# Patient Record
Sex: MALE | Race: WHITE | NOT HISPANIC OR LATINO | URBAN - METROPOLITAN AREA
[De-identification: names, ages, dates, MRNs, and addresses within clinical notes are randomized per-mention and may not be internally consistent; named-entity substitution may affect disease eponyms.]

---

## 2017-01-16 ENCOUNTER — FOLLOW UP (OUTPATIENT)
Dept: URBAN - METROPOLITAN AREA CLINIC 35 | Facility: CLINIC | Age: 67
End: 2017-01-16

## 2017-01-16 DIAGNOSIS — H40.9: ICD-10-CM

## 2017-01-16 DIAGNOSIS — H43.393: ICD-10-CM

## 2017-01-16 DIAGNOSIS — H34.11: ICD-10-CM

## 2017-01-16 DIAGNOSIS — E11.9: ICD-10-CM

## 2017-01-16 DIAGNOSIS — H25.9: ICD-10-CM

## 2017-01-16 PROCEDURE — 1036F TOBACCO NON-USER: CPT

## 2017-01-16 PROCEDURE — G8427 DOCREV CUR MEDS BY ELIG CLIN: HCPCS

## 2017-01-16 PROCEDURE — 92014 COMPRE OPH EXAM EST PT 1/>: CPT

## 2017-01-16 PROCEDURE — 4040F PNEUMOC VAC/ADMIN/RCVD: CPT

## 2017-01-16 PROCEDURE — G8482 FLU IMMUNIZE ORDER/ADMIN: HCPCS

## 2017-01-16 ASSESSMENT — TONOMETRY
OD_IOP_MMHG: 11
OS_IOP_MMHG: 15

## 2017-01-16 ASSESSMENT — VISUAL ACUITY
OS_PH: 20/30+1
OS_CC: 20/40+2

## 2017-07-31 ENCOUNTER — 6 MONTH FOLLOW UP (OUTPATIENT)
Dept: URBAN - METROPOLITAN AREA CLINIC 35 | Facility: CLINIC | Age: 67
End: 2017-07-31

## 2017-07-31 DIAGNOSIS — E11.9: ICD-10-CM

## 2017-07-31 DIAGNOSIS — H34.11: ICD-10-CM

## 2017-07-31 DIAGNOSIS — H25.9: ICD-10-CM

## 2017-07-31 DIAGNOSIS — H43.393: ICD-10-CM

## 2017-07-31 DIAGNOSIS — H40.9: ICD-10-CM

## 2017-07-31 PROCEDURE — 4040F PNEUMOC VAC/ADMIN/RCVD: CPT

## 2017-07-31 PROCEDURE — 92014 COMPRE OPH EXAM EST PT 1/>: CPT

## 2017-07-31 PROCEDURE — G8427 DOCREV CUR MEDS BY ELIG CLIN: HCPCS

## 2017-07-31 PROCEDURE — G8482 FLU IMMUNIZE ORDER/ADMIN: HCPCS

## 2017-07-31 PROCEDURE — 1036F TOBACCO NON-USER: CPT

## 2017-07-31 ASSESSMENT — TONOMETRY
OS_IOP_MMHG: 19
OD_IOP_MMHG: 18

## 2017-07-31 ASSESSMENT — VISUAL ACUITY: OS_CC: 20/20-1

## 2018-01-29 ENCOUNTER — 6 MONTH FOLLOW UP (OUTPATIENT)
Dept: URBAN - METROPOLITAN AREA CLINIC 35 | Facility: CLINIC | Age: 68
End: 2018-01-29

## 2018-01-29 DIAGNOSIS — H43.393: ICD-10-CM

## 2018-01-29 DIAGNOSIS — H25.9: ICD-10-CM

## 2018-01-29 DIAGNOSIS — H34.11: ICD-10-CM

## 2018-01-29 DIAGNOSIS — H40.9: ICD-10-CM

## 2018-01-29 DIAGNOSIS — E11.9: ICD-10-CM

## 2018-01-29 PROCEDURE — 92014 COMPRE OPH EXAM EST PT 1/>: CPT

## 2018-01-29 ASSESSMENT — VISUAL ACUITY: OS_CC: 20/30+1

## 2018-01-29 ASSESSMENT — TONOMETRY
OS_IOP_MMHG: 21
OD_IOP_MMHG: 20

## 2018-03-23 ENCOUNTER — OFFICE VISIT (OUTPATIENT)
Dept: CARDIOLOGY | Facility: CLINIC | Age: 68
End: 2018-03-23
Attending: INTERNAL MEDICINE
Payer: MEDICARE

## 2018-03-23 VITALS
OXYGEN SATURATION: 91 % | HEART RATE: 72 BPM | BODY MASS INDEX: 44.65 KG/M2 | DIASTOLIC BLOOD PRESSURE: 70 MMHG | HEIGHT: 63 IN | SYSTOLIC BLOOD PRESSURE: 138 MMHG | WEIGHT: 252 LBS

## 2018-03-23 DIAGNOSIS — I35.0 NONRHEUMATIC AORTIC VALVE STENOSIS: ICD-10-CM

## 2018-03-23 DIAGNOSIS — I10 ESSENTIAL HYPERTENSION: ICD-10-CM

## 2018-03-23 DIAGNOSIS — I25.10 CORONARY ARTERY DISEASE INVOLVING NATIVE CORONARY ARTERY OF NATIVE HEART WITHOUT ANGINA PECTORIS: ICD-10-CM

## 2018-03-23 DIAGNOSIS — I45.10 RBBB (RIGHT BUNDLE BRANCH BLOCK): ICD-10-CM

## 2018-03-23 DIAGNOSIS — I50.32 CHRONIC DIASTOLIC CONGESTIVE HEART FAILURE (CMS/HCC): Primary | ICD-10-CM

## 2018-03-23 DIAGNOSIS — I73.9 PERIPHERAL ARTERIAL DISEASE (CMS/HCC): ICD-10-CM

## 2018-03-23 DIAGNOSIS — I27.20 PULMONARY HYPERTENSION (CMS/HCC): ICD-10-CM

## 2018-03-23 DIAGNOSIS — E11.59 TYPE 2 DIABETES MELLITUS WITH OTHER CIRCULATORY COMPLICATION, WITH LONG-TERM CURRENT USE OF INSULIN: ICD-10-CM

## 2018-03-23 DIAGNOSIS — E78.2 MIXED HYPERLIPIDEMIA: ICD-10-CM

## 2018-03-23 DIAGNOSIS — G47.33 OSA (OBSTRUCTIVE SLEEP APNEA): ICD-10-CM

## 2018-03-23 DIAGNOSIS — Z79.4 TYPE 2 DIABETES MELLITUS WITH OTHER CIRCULATORY COMPLICATION, WITH LONG-TERM CURRENT USE OF INSULIN: ICD-10-CM

## 2018-03-23 DIAGNOSIS — I65.23 ATHEROSCLEROSIS OF BOTH CAROTID ARTERIES: ICD-10-CM

## 2018-03-23 PROBLEM — I50.9 CONGESTIVE HEART FAILURE (CMS/HCC): Status: ACTIVE | Noted: 2018-03-23

## 2018-03-23 PROCEDURE — 99214 OFFICE O/P EST MOD 30 MIN: CPT | Performed by: INTERNAL MEDICINE

## 2018-03-23 PROCEDURE — 93000 ELECTROCARDIOGRAM COMPLETE: CPT | Performed by: INTERNAL MEDICINE

## 2018-03-23 RX ORDER — METOPROLOL SUCCINATE 100 MG/1
100 TABLET, EXTENDED RELEASE ORAL
COMMUNITY
Start: 2017-08-18 | End: 2018-03-23 | Stop reason: SDUPTHER

## 2018-03-23 RX ORDER — METOCLOPRAMIDE 10 MG/1
10 TABLET ORAL NIGHTLY
Refills: 3 | COMMUNITY
Start: 2018-03-08 | End: 2019-04-11

## 2018-03-23 RX ORDER — LEVOTHYROXINE SODIUM 75 UG/1
75 TABLET ORAL
Refills: 4 | COMMUNITY
Start: 2018-03-09

## 2018-03-23 RX ORDER — LATANOPROST 50 UG/ML
SOLUTION/ DROPS OPHTHALMIC
Refills: 1 | COMMUNITY
Start: 2018-02-21 | End: 2019-04-11

## 2018-03-23 RX ORDER — TORSEMIDE 20 MG/1
20 TABLET ORAL
COMMUNITY
Start: 2017-11-24 | End: 2018-03-23

## 2018-03-23 RX ORDER — INSULIN GLARGINE 100 [IU]/ML
INJECTION, SOLUTION SUBCUTANEOUS
COMMUNITY
Start: 2017-11-24

## 2018-03-23 RX ORDER — ASPIRIN 325 MG
325 TABLET ORAL DAILY
COMMUNITY
Start: 2015-02-10

## 2018-03-23 RX ORDER — ATORVASTATIN CALCIUM 40 MG/1
40 TABLET, FILM COATED ORAL
COMMUNITY
Start: 2017-09-21 | End: 2018-09-18 | Stop reason: SDUPTHER

## 2018-03-23 RX ORDER — TORSEMIDE 20 MG/1
40 TABLET ORAL
Qty: 180 TABLET | Refills: 11 | Status: SHIPPED | OUTPATIENT
Start: 2018-03-23 | End: 2018-03-23 | Stop reason: SDUPTHER

## 2018-03-23 RX ORDER — ATORVASTATIN CALCIUM 40 MG/1
TABLET, FILM COATED ORAL
Refills: 3 | COMMUNITY
Start: 2017-12-19 | End: 2018-03-23 | Stop reason: SDUPTHER

## 2018-03-23 RX ORDER — NIFEDIPINE 90 MG/1
90 TABLET, EXTENDED RELEASE ORAL
COMMUNITY
Start: 2018-01-19 | End: 2018-03-23 | Stop reason: SDUPTHER

## 2018-03-23 RX ORDER — BLOOD SUGAR DIAGNOSTIC
STRIP MISCELLANEOUS
Refills: 3 | COMMUNITY
Start: 2018-03-08

## 2018-03-23 RX ORDER — FERROUS SULFATE 325(65) MG
1 TABLET ORAL
Refills: 3 | COMMUNITY
Start: 2017-12-26 | End: 2018-03-23 | Stop reason: SDUPTHER

## 2018-03-23 RX ORDER — TAMSULOSIN HYDROCHLORIDE 0.4 MG/1
0.4 CAPSULE ORAL
COMMUNITY
End: 2018-03-23

## 2018-03-23 RX ORDER — CALCIPOTRIENE, BETAMETHASONE DIPROPIONATE 50; .643 UG/G; MG/G
OINTMENT TOPICAL
COMMUNITY

## 2018-03-23 RX ORDER — METOCLOPRAMIDE 5 MG/1
5 TABLET ORAL
COMMUNITY
Start: 2017-05-19 | End: 2019-04-11

## 2018-03-23 RX ORDER — NIFEDIPINE 90 MG/1
TABLET, EXTENDED RELEASE ORAL
Refills: 11 | COMMUNITY
Start: 2018-01-02 | End: 2019-03-20 | Stop reason: SDUPTHER

## 2018-03-23 RX ORDER — INSULIN GLARGINE 100 [IU]/ML
INJECTION, SOLUTION SUBCUTANEOUS
Refills: 3 | COMMUNITY
Start: 2018-02-23

## 2018-03-23 RX ORDER — POTASSIUM CHLORIDE 1500 MG/1
20 TABLET, EXTENDED RELEASE ORAL 2 TIMES DAILY
Refills: 3 | COMMUNITY
Start: 2018-02-01

## 2018-03-23 RX ORDER — TORSEMIDE 20 MG/1
40 TABLET ORAL 2 TIMES DAILY
COMMUNITY
Start: 2018-03-23 | End: 2018-10-19 | Stop reason: SDUPTHER

## 2018-03-23 RX ORDER — METOPROLOL SUCCINATE 100 MG/1
TABLET, EXTENDED RELEASE ORAL
Refills: 3 | COMMUNITY
Start: 2018-01-11 | End: 2018-10-19 | Stop reason: SDUPTHER

## 2018-03-23 RX ORDER — ESOMEPRAZOLE MAGNESIUM 40 MG/1
40 CAPSULE, DELAYED RELEASE ORAL
COMMUNITY
End: 2018-03-23 | Stop reason: SDUPTHER

## 2018-03-23 RX ORDER — INSULIN LISPRO 100 [IU]/ML
INJECTION, SOLUTION INTRAVENOUS; SUBCUTANEOUS
Refills: 3 | COMMUNITY
Start: 2018-02-04

## 2018-03-23 RX ORDER — ESOMEPRAZOLE MAGNESIUM 40 MG/1
40 CAPSULE, DELAYED RELEASE ORAL
Refills: 3 | COMMUNITY
Start: 2018-02-24

## 2018-03-23 RX ORDER — ASCORBIC ACID 500 MG
500 TABLET ORAL
COMMUNITY
End: 2018-03-23 | Stop reason: SDUPTHER

## 2018-03-23 RX ORDER — LANCETS
EACH MISCELLANEOUS
Refills: 3 | COMMUNITY
Start: 2018-02-13 | End: 2018-03-23 | Stop reason: SDUPTHER

## 2018-03-23 RX ORDER — TAMSULOSIN HYDROCHLORIDE 0.4 MG/1
0.4 CAPSULE ORAL DAILY
COMMUNITY
Start: 2017-11-24

## 2018-03-23 RX ORDER — CHLORHEXIDINE GLUCONATE ORAL RINSE 1.2 MG/ML
SOLUTION DENTAL
Refills: 3 | COMMUNITY
Start: 2018-01-16 | End: 2018-03-23 | Stop reason: SDUPTHER

## 2018-03-23 RX ORDER — LATANOPROST 50 UG/ML
1 SOLUTION/ DROPS OPHTHALMIC DAILY
COMMUNITY
Start: 2015-01-27

## 2018-03-23 RX ORDER — LEVOTHYROXINE SODIUM 50 UG/1
TABLET ORAL
COMMUNITY
End: 2018-03-23 | Stop reason: SDUPTHER

## 2018-03-23 RX ORDER — SODIUM PHOSPHATE, MONOBASIC, MONOHYDRATE, SODIUM PHOSPHATE, DIBASIC ANHYDROUS 1.102; .398 G/1; G/1
TABLET ORAL SEE ADMIN INSTRUCTIONS
Refills: 0 | COMMUNITY
Start: 2018-01-02 | End: 2018-03-23 | Stop reason: SDUPTHER

## 2018-03-23 RX ORDER — TAMSULOSIN HYDROCHLORIDE 0.4 MG/1
CAPSULE ORAL
Refills: 6 | COMMUNITY
Start: 2018-02-23 | End: 2018-03-23 | Stop reason: SDUPTHER

## 2018-03-23 RX ORDER — TORSEMIDE 20 MG/1
40 TABLET ORAL
Refills: 11 | COMMUNITY
Start: 2017-12-27 | End: 2018-03-23

## 2018-03-23 RX ORDER — AMOXICILLIN 500 MG/1
CAPSULE ORAL
Refills: 0 | COMMUNITY
Start: 2018-01-29

## 2018-03-23 RX ORDER — POTASSIUM CHLORIDE 1500 MG/1
20 TABLET, EXTENDED RELEASE ORAL
COMMUNITY
End: 2018-03-23 | Stop reason: SDUPTHER

## 2018-03-23 ASSESSMENT — ENCOUNTER SYMPTOMS
LEFT EYE: 0
BACK PAIN: 1
WEIGHT GAIN: 0
LIGHT-HEADEDNESS: 0
BRUISES/BLEEDS EASILY: 0
SHORTNESS OF BREATH: 0
SNORING: 0
VOMITING: 0
COUGH: 0
ABDOMINAL PAIN: 0
WHEEZING: 0
DEPRESSION: 0
ORTHOPNEA: 0
HEMATOCHEZIA: 0
PND: 0
IRREGULAR HEARTBEAT: 0
SYNCOPE: 0
MYALGIAS: 0
ALTERED MENTAL STATUS: 0
DYSPNEA ON EXERTION: 0
NAUSEA: 0
DIZZINESS: 0
HEMATURIA: 0
NEAR-SYNCOPE: 0
RIGHT EYE: 0
PALPITATIONS: 0
COLOR CHANGE: 1

## 2018-03-23 NOTE — PATIENT INSTRUCTIONS
--Exercise!!  --Compression socks (15-30 mmHg of pressure)  --Call me if dizziness continues  --Echo in October with next visit

## 2018-03-23 NOTE — ASSESSMENT & PLAN NOTE
Hospitalized October 14-24, 2016 for acute on chronic diastolic heart failure with aggressive diuresis with IV Bumex and ultimate transition to PO torsemide prior to discharge. Notably, admission weight was 244 pounds and discharge weight 225 pounds.   He is 252 pounds on exam today, stable from last visit November 2017.  As previously discussed, dyspnea and hypoxemia are likely multifactorial: secondary to chronic diastolic heart failure, untreated sleep apnea, and restrictive lung disease in the setting of obesity.   --Continue torsemide 40mg TWICE daily.   --Monitor home weights and call with increase >2 pounds in 24 hours.   --Stressed fluid and dietary compliance. CHF education provided.   --Stressed at length the importance of compliance with BiPAP, which has been an ongoing struggle for him.   --Also recommend compression socks, knee length.   --CHF diet discussed at length.

## 2018-03-23 NOTE — ASSESSMENT & PLAN NOTE
Poor compliance with CPAP, now trying to use BiPAP although compliance remains suboptimal.   --Again stressed the importance of compliance with BiPAP therapy and risk of heart failure, severe hypertension, arrhythmia, stroke, and death without treatment. He reports understanding and will attempt to increase use. Follow-up with sleep medicine.   --Discussed lifestyle modification and need for weight loss. Recommend consideration of bariatric surgery as a majority of his medical issues are secondary to obesity.

## 2018-03-23 NOTE — ASSESSMENT & PLAN NOTE
Status post #23 [facility] [employer] Trifecta, 5/14/15 with Dr. Quiroga.   Echocardiogram from 10/18/16: Status post #23 trifecta aortic valve prosthesis with peak velocity 2.2 m/s, mean gradient 11 mmHg. Mild paravalvular aortic regurgitation.   --Repeat echocardiogram October 2018.  --Continue aspirin and statin.   --Patient aware of need for antibiotic prophylaxis prior to dental procedures, has a prescription for amoxicillin.

## 2018-03-23 NOTE — ASSESSMENT & PLAN NOTE
The patient has associated coronary and peripheral artery disease and diabetes.  He is tolerating lipid lowering therapy well.  Lipids from 3/13/17: total cholesterol 144, triglycerides 130, HDL 44, LDL 74.  --Continue high-intensity statin, atorvastatin 40 mg daily. Goal LDL <70.

## 2018-03-23 NOTE — ASSESSMENT & PLAN NOTE
[facility] 10/14/16: RA 16, RV 75/16, PA 75/27, PCWP 26, CO 5.7, CI 2.7, PA sat 58%.  Secondary to diastolic heart failure and underlying obesity hypoventilation syndrome.  --Continue diuresis.  --Encouraged weight loss.

## 2018-03-23 NOTE — ASSESSMENT & PLAN NOTE
Carotid CTA 9/9/2014 showed mild atherosclerosis of the right carotid bifurcation and 40% stenosis at the origin of the left internal carotid artery with heavily calcified plaque at this level. Carotid US 11/4/17 reports no significant stenosis.   --Continue high intensity statin, atorvastatin 40mg daily.   --Continue aspirin, but advised to decrease to 81mg.

## 2018-03-23 NOTE — LETTER
March 23, 2018     Alan Solorio, DO  [address] [facility] [address]    Patient: Jimmie Solorio   YOB: 1950   Date of Visit: 3/23/2018       Dear Dr. Solorio:    Thank you for referring Jimmie Solorio to me for evaluation. Below are my notes for this consultation.    If you have questions, please do not hesitate to call me. I look forward to following your patient along with you.         Sincerely,        Crys Valencia MD        CC: Christopher J Frankel, MD Katie M. Hawthorne, MD  3/23/2018  3:08 PM  Sign at close encounter   Crys Valencia MD, [facility]  Cardiology    [facility]  [facility]  [address] [place] [address]    TEL  [phone]  [facility].[email or web address]     March 23, 2018    Reason for visit: Cardiovascular Follow-Up.    Mr. Solorio is a 67 year old man with chronic diastolic heart failure, aortic stenosis status post bioprosthetic aortic valve replacement (5/14/15), nonobstructive coronary artery disease, carotid artery disease, hypertension, hyperlipidemia, diabetes, and obstructive sleep apnea; who presents for cardiovascular follow-up. I last saw him in the office on November 24, 2017 at which time he had a 20 pound weight gain, therefore I increased standing torsemide although weight was some fluid and some weight from overeating.    Today, Mr. Solorio returns for cardiovascular follow-up and is accompanied by his wife, who assists in history and decision-making. He continues to have significant dietary indiscretion. He is again following with Dr. Gibson, however does not stick to her diet plan. Jorge Luis is not walking. He states he is limited by back pain and left leg claudication as well as hip pain. He denies chest pain, shortness of breath, or orthopnea. Lower extremity edema is stable. He is not wearing compression stockings, nor using his CPAP regularly. He denies palpitations, dizziness,  lightheadedness, or syncope.     Past Medical History:  1. Aortic stenosis: s/p AVR (#23 [facility] Trifecta, 5/14/15)  2. Coronary artery disease: non-obstructive 30% prox LCx, 30% PDA  3. Hypertension  4. Hyperlipidemia  5. Carotid artery disease  6. Peripheral vascular disease: left SFA  7. Right bundle branch block  8. Obstructive sleep apnea: non-compliant with CPAP  9. CVA: Retinal stroke  10. Diabetes mellitus  11. Former tobacco abuse  12. GERD    Past Surgical History:  1. Aortic valve replacement: #23 [facility] Trifecta, 5/14/15, Dr. Quiroga    Medications (reviewed with patient and updated in EMR): aspirin 325mg daily, atorvastatin 40mg daily, torsemide 40mg daily, metoprolol succinate 150mg daily, nifedipine 90mg daily, potassium 20 mEq daily, amoxicillin 2g PRN dental procedures, insulin as directed, levothyroxine 75mcg daily, Nexium 40mg daily, sildenafil 25 mg as needed, vitamin B, co-Q 10, vitamin C.    Allergies: No known drug allergies.    Social History: . Two children. Currently unemployed, previously worked as an .     Family History: Reviewed and noncontributory.    Review of Systems   Constitution: Positive for malaise/fatigue. Negative for weight gain.   HENT: Negative for nosebleeds.    Eyes: Negative for vision loss in left eye, vision loss in right eye and visual disturbance.   Cardiovascular: Positive for leg swelling. Negative for chest pain, cyanosis, dyspnea on exertion, irregular heartbeat, near-syncope, orthopnea, palpitations, paroxysmal nocturnal dyspnea and syncope.   Respiratory: Negative for cough, shortness of breath, snoring and wheezing.    Endocrine: Negative for polyuria.   Hematologic/Lymphatic: Negative for bleeding problem. Does not bruise/bleed easily.   Skin: Positive for color change and rash.   Musculoskeletal: Positive for arthritis and back pain. Negative for myalgias.   Gastrointestinal: Negative for abdominal pain, hematochezia, melena,  nausea and vomiting.   Genitourinary: Negative for hematuria.   Neurological: Negative for dizziness and light-headedness.   Psychiatric/Behavioral: Negative for altered mental status and depression.       Objective   Vitals:    03/23/18 1154   BP: 138/70   Pulse: 72   SpO2: (!) 91%     Body mass index is 44.64 kg/m².  Physical Exam   Constitutional: He appears well-developed and well-nourished. No distress.   HENT:   Head: Normocephalic.   Mouth/Throat: Mucous membranes are normal. Mucous membranes are not cyanotic.   Eyes: Conjunctivae are normal.   Neck: No JVD present. Carotid bruit is not present.   Cardiovascular: Normal rate, regular rhythm, S1 normal and S2 normal.   No extrasystoles are present. Exam reveals no gallop.    Murmur (2/6 SAIMA, 1/6 diastolic blowing at base) heard.  Pulses:       Carotid pulses are 2+ on the right side, and 2+ on the left side.       Radial pulses are 2+ on the right side, and 2+ on the left side.   Pulmonary/Chest: Effort normal. No respiratory distress. He has no wheezes. He has no rales.   Abdominal: Soft. Bowel sounds are normal. There is no tenderness.   Musculoskeletal: He exhibits edema.   Lymphadenopathy:     He has no cervical adenopathy.   Neurological: He is alert.   Skin: Skin is warm and dry. Rash (Chronic stasis changes.) noted. No cyanosis.   Psychiatric: He has a normal mood and affect. His speech is normal.     Labs:  Phlebotomy 3/13/17: Hemoglobin A1c 6.9, sodium 138, potassium 4.2, BUN 41, creatinine 1.53, GFR 46, total cholesterol 144, triglycerides 130, HDL 44, LDL 74. From 11/17/16: creatinine 1.6, GFR 44. My interpretation: stable renal insufficiency, normal potassium, low HDL, adequate LDL, adequate hemoglobin A1c.    Cardiovascular Studies:  1. Echocardiogram 10/18/16: Mild concentric left ventricular hypertrophy with normal cavity size and preserved systolic function, EF 60%. Abnormal septal motion. Mildly dilated right ventricular cavity size with  preserved systolic function. Status post #23 trifecta aortic valve prosthesis with peak velocity 2.2 m/s, mean gradient 11 mmHg. Mild aortic regurgitation. Mild mitral stenosis with mean gradient of 2 mmHg heart rate is 71. Trace mitral regurgitation. Severe MAC. Unable to estimate right ventricular systolic pressure secondary to lack of TR jet.  2. [facility] 10/14/16: RA 16, RV 75/16, PA 75/27, PCWP 26, CO 5.7, CI 2.7, PA sat 58%.  3. [facility] 5/18/15 (personally reviewed): Post-AVR. Bioprosthetic AVR with peak velocity 1.98 m/s, mean gradient of 8 mmHg. No paravalvular regurgitation. Trace transvalvular regurgitation. Normal LV size and systolic function. Normal RV size and systolic function.   4. Coronary angiogram 3/9/15 ([place]): patent LMCA, 30% prox LCx, 30% PDA.   5. Carotid CTA 9/9/2014: Mild atherosclerotic changes of the right carotid bifurcation with out evidence for focal areas of stenosis at the origin of the right ICA. Approximately 40% stenosis at the origin of the left ICA given by heavily calcified plaque at this level. Patent b/l vertebral arteries w/o focal areas of stenosis.    ECG from today personally reviewed and discussed with the patient shows sinus rhythm with right bundle branch block, left atrial enlargement.    Assessment/Plan   Problem List Items Addressed This Visit     Pulmonary hypertension     [facility] 10/14/16: RA 16, RV 75/16, PA 75/27, PCWP 26, CO 5.7, CI 2.7, PA sat 58%.  Secondary to diastolic heart failure and underlying obesity hypoventilation syndrome.  --Continue diuresis.  --Encouraged weight loss.         Congestive heart failure (CMS/HCC) (HCC) - Primary     Hospitalized October 14-24, 2016 for acute on chronic diastolic heart failure with aggressive diuresis with IV Bumex and ultimate transition to PO torsemide prior to discharge. Notably, admission weight was 244 pounds and discharge weight 225 pounds.   He is 252 pounds on exam today, stable from last visit November 2017.  As  previously discussed, dyspnea and hypoxemia are likely multifactorial: secondary to chronic diastolic heart failure, untreated sleep apnea, and restrictive lung disease in the setting of obesity.   --Continue torsemide 40mg TWICE daily.   --Monitor home weights and call with increase >2 pounds in 24 hours.   --Stressed fluid and dietary compliance. CHF education provided.   --Stressed at length the importance of compliance with BiPAP, which has been an ongoing struggle for him.   --Also recommend compression socks, knee length.   --CHF diet discussed at length.          Relevant Medications    atorvastatin (LIPITOR) 40 mg tablet    metoprolol succinate XL (TOPROL-XL) 100 mg 24 hr tablet    NIFEdipine XL (PROCARDIA XL) 90 mg 24 hr tablet    Other Relevant Orders    ECG 12 lead (Completed)    Transthoracic echo (TTE) complete    Coronary artery disease involving native coronary artery of native heart without angina pectoris     Coronary angiogram 3/9/15 ([place]): patent LMCA, 30% prox LCx, 30% PDA.    Asymptomatic.   --Continue aspirin, high intensity statin, and beta blocker.           Relevant Medications    atorvastatin (LIPITOR) 40 mg tablet    metoprolol succinate XL (TOPROL-XL) 100 mg 24 hr tablet    NIFEdipine XL (PROCARDIA XL) 90 mg 24 hr tablet    Essential hypertension     Blood pressure mildly elevated on exam today, measuring 138/70mmHg.  --Continue current regimen for now.  --Consider addition of Imdur/hydralazine if remains >130/80 mmHg.         Relevant Medications    metoprolol succinate XL (TOPROL-XL) 100 mg 24 hr tablet    NIFEdipine XL (PROCARDIA XL) 90 mg 24 hr tablet    torsemide (DEMADEX) 20 mg tablet    Mixed hyperlipidemia     The patient has associated coronary and peripheral artery disease and diabetes.  He is tolerating lipid lowering therapy well.  Lipids from 3/13/17: total cholesterol 144, triglycerides 130, HDL 44, LDL 74.  --Continue high-intensity statin, atorvastatin 40 mg daily.  Goal LDL <70.         Relevant Medications    atorvastatin (LIPITOR) 40 mg tablet    FREDRICK (obstructive sleep apnea)     Poor compliance with CPAP, now trying to use BiPAP although compliance remains suboptimal.   --Again stressed the importance of compliance with BiPAP therapy and risk of heart failure, severe hypertension, arrhythmia, stroke, and death without treatment. He reports understanding and will attempt to increase use. Follow-up with sleep medicine.   --Discussed lifestyle modification and need for weight loss. Recommend consideration of bariatric surgery as a majority of his medical issues are secondary to obesity.         Nonrheumatic aortic valve stenosis     Status post #23 [facility] Trifecta, 5/14/15 with Dr. Quiroga.   Echocardiogram from 10/18/16: Status post #23 trifecta aortic valve prosthesis with peak velocity 2.2 m/s, mean gradient 11 mmHg. Mild paravalvular aortic regurgitation.   --Repeat echocardiogram October 2018.  --Continue aspirin and statin.   --Patient aware of need for antibiotic prophylaxis prior to dental procedures, has a prescription for amoxicillin.         Relevant Medications    atorvastatin (LIPITOR) 40 mg tablet    metoprolol succinate XL (TOPROL-XL) 100 mg 24 hr tablet    NIFEdipine XL (PROCARDIA XL) 90 mg 24 hr tablet    Atherosclerosis of both carotid arteries     Carotid CTA 9/9/2014 showed mild atherosclerosis of the right carotid bifurcation and 40% stenosis at the origin of the left internal carotid artery with heavily calcified plaque at this level. Carotid US 11/4/17 reports no significant stenosis.   --Continue high intensity statin, atorvastatin 40mg daily.   --Continue aspirin, but advised to decrease to 81mg.           RBBB (right bundle branch block)     ECG from today personally reviewed and discussed with the patient and his wife shows stable right bundle branch block.  The patient has had no syncopal episodes.    --No clinical bradyarrhythmias are noted.   Continue to monitor.   --Treat obstructive sleep apnea as below.         Relevant Medications    metoprolol succinate XL (TOPROL-XL) 100 mg 24 hr tablet    Type 2 diabetes mellitus with circulatory disorder, with long-term current use of insulin (CMS/[facility]) ([facility])    Relevant Medications    insulin glargine (BASAGLAR KWIKPEN) 100 unit/mL (3 mL) subcutaneous pen    insulin lispro protamin-lispro (HumaLOG Mix 75-25) 100 unit/mL (75-25) suspension    BASAGLAR KWIKPEN 100 unit/mL (3 mL) subcutaneous pen    HUMALOG KWIKPEN 100 unit/mL subcutaneous pen    Class 3 obesity due to excess calories without serious comorbidity with body mass index (BMI) of 40.0 to 44.9 in adult (CMS/[facility]) ([facility])     BMI 44.6.  --Encouraged weight loss.  --Needs to start walking or doing some exercise daily. Discussed at length.  --Consider bariatric surgery.         Peripheral arterial disease (CMS/[facility]) ([facility])     >75% left SFA with claudication.   --He is not exercising. Stressed the importance of walking.   --Followed by Dr. Wiley.   --Continue aspirin and statin.                   Crys Valencia MD, FACC   3/23/2018

## 2018-03-23 NOTE — ASSESSMENT & PLAN NOTE
Blood pressure mildly elevated on exam today, measuring 138/70mmHg.  --Continue current regimen for now.  --Consider addition of Imdur/hydralazine if remains >130/80 mmHg.

## 2018-03-23 NOTE — ASSESSMENT & PLAN NOTE
BMI 44.6.  --Encouraged weight loss.  --Needs to start walking or doing some exercise daily. Discussed at length.  --Consider bariatric surgery.

## 2018-03-23 NOTE — ASSESSMENT & PLAN NOTE
ECG from today personally reviewed and discussed with the patient and his wife shows stable right bundle branch block.  The patient has had no syncopal episodes.    --No clinical bradyarrhythmias are noted.  Continue to monitor.   --Treat obstructive sleep apnea as below.

## 2018-03-23 NOTE — ASSESSMENT & PLAN NOTE
Coronary angiogram 3/9/15 ([place]): patent LMCA, 30% prox LCx, 30% PDA.    Asymptomatic.   --Continue aspirin, high intensity statin, and beta blocker.

## 2018-03-23 NOTE — PROGRESS NOTES
Crys Valencia MD, [facility]  Cardiology    [facility]    [facility]  [facility] [address]  [address] [place] [address]    TEL  [phone]  [facility]     March 23, 2018    Reason for visit: Cardiovascular Follow-Up.    Mr. Solorio is a 67 year old man with chronic diastolic heart failure, aortic stenosis status post bioprosthetic aortic valve replacement (5/14/15), nonobstructive coronary artery disease, carotid artery disease, hypertension, hyperlipidemia, diabetes, and obstructive sleep apnea; who presents for cardiovascular follow-up. I last saw him in the office on November 24, 2017 at which time he had a 20 pound weight gain, therefore I increased standing torsemide although weight was some fluid and some weight from overeating.    Today, Mr. Solorio returns for cardiovascular follow-up and is accompanied by his wife, who assists in history and decision-making. He continues to have significant dietary indiscretion. He is again following with Dr. Gibson, however does not stick to her diet plan. Jorge Luis is not walking. He states he is limited by back pain and left leg claudication as well as hip pain. He denies chest pain, shortness of breath, or orthopnea. Lower extremity edema is stable. He is not wearing compression stockings, nor using his CPAP regularly. He denies palpitations, dizziness, lightheadedness, or syncope.     Past Medical History:  1. Aortic stenosis: s/p AVR (#23 [employer] Trifecta, 5/14/15)  2. Coronary artery disease: non-obstructive 30% prox LCx, 30% PDA  3. Hypertension  4. Hyperlipidemia  5. Carotid artery disease  6. Peripheral vascular disease: left SFA  7. Right bundle branch block  8. Obstructive sleep apnea: non-compliant with CPAP  9. CVA: Retinal stroke  10. Diabetes mellitus  11. Former tobacco abuse  12. GERD    Past Surgical History:  1. Aortic valve replacement: #23 [facility] [place] Trifecta, 5/14/15,   [facility]    Medications (reviewed with patient and updated in EMR): aspirin 325mg daily, atorvastatin 40mg daily, torsemide 40mg daily, metoprolol succinate 150mg daily, nifedipine 90mg daily, potassium 20 mEq daily, amoxicillin 2g PRN dental procedures, insulin as directed, levothyroxine 75mcg daily, Nexium 40mg daily, sildenafil 25 mg as needed, vitamin B, co-Q 10, vitamin C.    Allergies: No known drug allergies.    Social History: . Two children. Currently unemployed, previously worked as an .     Family History: Reviewed and noncontributory.    Review of Systems   Constitution: Positive for malaise/fatigue. Negative for weight gain.   HENT: Negative for nosebleeds.    Eyes: Negative for vision loss in left eye, vision loss in right eye and visual disturbance.   Cardiovascular: Positive for leg swelling. Negative for chest pain, cyanosis, dyspnea on exertion, irregular heartbeat, near-syncope, orthopnea, palpitations, paroxysmal nocturnal dyspnea and syncope.   Respiratory: Negative for cough, shortness of breath, snoring and wheezing.    Endocrine: Negative for polyuria.   Hematologic/Lymphatic: Negative for bleeding problem. Does not bruise/bleed easily.   Skin: Positive for color change and rash.   Musculoskeletal: Positive for arthritis and back pain. Negative for myalgias.   Gastrointestinal: Negative for abdominal pain, hematochezia, melena, nausea and vomiting.   Genitourinary: Negative for hematuria.   Neurological: Negative for dizziness and light-headedness.   Psychiatric/Behavioral: Negative for altered mental status and depression.       Objective   Vitals:    03/23/18 1154   BP: 138/70   Pulse: 72   SpO2: (!) 91%     Body mass index is 44.64 kg/m².  Physical Exam   Constitutional: He appears well-developed and well-nourished. No distress.   HENT:   Head: Normocephalic.   Mouth/Throat: Mucous membranes are normal. Mucous membranes are not cyanotic.   Eyes: Conjunctivae are  normal.   Neck: No JVD present. Carotid bruit is not present.   Cardiovascular: Normal rate, regular rhythm, S1 normal and S2 normal.   No extrasystoles are present. Exam reveals no gallop.    Murmur (2/6 SAIMA, 1/6 diastolic blowing at base) heard.  Pulses:       Carotid pulses are 2+ on the right side, and 2+ on the left side.       Radial pulses are 2+ on the right side, and 2+ on the left side.   Pulmonary/Chest: Effort normal. No respiratory distress. He has no wheezes. He has no rales.   Abdominal: Soft. Bowel sounds are normal. There is no tenderness.   Musculoskeletal: He exhibits edema.   Lymphadenopathy:     He has no cervical adenopathy.   Neurological: He is alert.   Skin: Skin is warm and dry. Rash (Chronic stasis changes.) noted. No cyanosis.   Psychiatric: He has a normal mood and affect. His speech is normal.     Labs:  Phlebotomy 3/13/17: Hemoglobin A1c 6.9, sodium 138, potassium 4.2, BUN 41, creatinine 1.53, GFR 46, total cholesterol 144, triglycerides 130, HDL 44, LDL 74. From 11/17/16: creatinine 1.6, GFR 44. My interpretation: stable renal insufficiency, normal potassium, low HDL, adequate LDL, adequate hemoglobin A1c.    Cardiovascular Studies:  1. Echocardiogram 10/18/16: Mild concentric left ventricular hypertrophy with normal cavity size and preserved systolic function, EF 60%. Abnormal septal motion. Mildly dilated right ventricular cavity size with preserved systolic function. Status post #23 trifecta aortic valve prosthesis with peak velocity 2.2 m/s, mean gradient 11 mmHg. Mild aortic regurgitation. Mild mitral stenosis with mean gradient of 2 mmHg heart rate is 71. Trace mitral regurgitation. Severe MAC. Unable to estimate right ventricular systolic pressure secondary to lack of TR jet.  2. RHC 10/14/16: RA 16, RV 75/16, PA 75/27, PCWP 26, CO 5.7, CI 2.7, PA sat 58%.  3. NESHA 5/18/15 (personally reviewed): Post-AVR. Bioprosthetic AVR with peak velocity 1.98 m/s, mean gradient of 8 mmHg. No  paravalvular regurgitation. Trace transvalvular regurgitation. Normal LV size and systolic function. Normal RV size and systolic function.   4. Coronary angiogram 3/9/15 ([place]): patent LMCA, 30% prox LCx, 30% PDA.   5. Carotid CTA 9/9/2014: Mild atherosclerotic changes of the right carotid bifurcation with out evidence for focal areas of stenosis at the origin of the right ICA. Approximately 40% stenosis at the origin of the left ICA given by heavily calcified plaque at this level. Patent b/l vertebral arteries w/o focal areas of stenosis.    ECG from today personally reviewed and discussed with the patient shows sinus rhythm with right bundle branch block, left atrial enlargement.    Assessment/Plan   Problem List Items Addressed This Visit     Pulmonary hypertension     RHC 10/14/16: RA 16, RV 75/16, PA 75/27, PCWP 26, CO 5.7, CI 2.7, PA sat 58%.  Secondary to diastolic heart failure and underlying obesity hypoventilation syndrome.  --Continue diuresis.  --Encouraged weight loss.         Congestive heart failure (CMS/HCC) ([facility]) - Primary     Hospitalized October 14-24, 2016 for acute on chronic diastolic heart failure with aggressive diuresis with IV Bumex and ultimate transition to PO torsemide prior to discharge. Notably, admission weight was 244 pounds and discharge weight 225 pounds.   He is 252 pounds on exam today, stable from last visit November 2017.  As previously discussed, dyspnea and hypoxemia are likely multifactorial: secondary to chronic diastolic heart failure, untreated sleep apnea, and restrictive lung disease in the setting of obesity.   --Continue torsemide 40mg TWICE daily.   --Monitor home weights and call with increase >2 pounds in 24 hours.   --Stressed fluid and dietary compliance. CHF education provided.   --Stressed at length the importance of compliance with BiPAP, which has been an ongoing struggle for him.   --Also recommend compression socks, knee length.   --CHF diet discussed  at length.          Relevant Medications    atorvastatin (LIPITOR) 40 mg tablet    metoprolol succinate XL (TOPROL-XL) 100 mg 24 hr tablet    NIFEdipine XL (PROCARDIA XL) 90 mg 24 hr tablet    Other Relevant Orders    ECG 12 lead (Completed)    Transthoracic echo (TTE) complete    Coronary artery disease involving native coronary artery of native heart without angina pectoris     Coronary angiogram 3/9/15 ([place]): patent LMCA, 30% prox LCx, 30% PDA.    Asymptomatic.   --Continue aspirin, high intensity statin, and beta blocker.           Relevant Medications    atorvastatin (LIPITOR) 40 mg tablet    metoprolol succinate XL (TOPROL-XL) 100 mg 24 hr tablet    NIFEdipine XL (PROCARDIA XL) 90 mg 24 hr tablet    Essential hypertension     Blood pressure mildly elevated on exam today, measuring 138/70mmHg.  --Continue current regimen for now.  --Consider addition of Imdur/hydralazine if remains >130/80 mmHg.         Relevant Medications    metoprolol succinate XL (TOPROL-XL) 100 mg 24 hr tablet    NIFEdipine XL (PROCARDIA XL) 90 mg 24 hr tablet    torsemide (DEMADEX) 20 mg tablet    Mixed hyperlipidemia     The patient has associated coronary and peripheral artery disease and diabetes.  He is tolerating lipid lowering therapy well.  Lipids from 3/13/17: total cholesterol 144, triglycerides 130, HDL 44, LDL 74.  --Continue high-intensity statin, atorvastatin 40 mg daily. Goal LDL <70.         Relevant Medications    atorvastatin (LIPITOR) 40 mg tablet    FREDRICK (obstructive sleep apnea)     Poor compliance with CPAP, now trying to use BiPAP although compliance remains suboptimal.   --Again stressed the importance of compliance with BiPAP therapy and risk of heart failure, severe hypertension, arrhythmia, stroke, and death without treatment. He reports understanding and will attempt to increase use. Follow-up with sleep medicine.   --Discussed lifestyle modification and need for weight loss. Recommend consideration of  bariatric surgery as a majority of his medical issues are secondary to obesity.         Nonrheumatic aortic valve stenosis     Status post #23 [facility] Trifecta, 5/14/15 with Dr. Quiroga.   Echocardiogram from 10/18/16: Status post #23 trifecta aortic valve prosthesis with peak velocity 2.2 m/s, mean gradient 11 mmHg. Mild paravalvular aortic regurgitation.   --Repeat echocardiogram October 2018.  --Continue aspirin and statin.   --Patient aware of need for antibiotic prophylaxis prior to dental procedures, has a prescription for amoxicillin.         Relevant Medications    atorvastatin (LIPITOR) 40 mg tablet    metoprolol succinate XL (TOPROL-XL) 100 mg 24 hr tablet    NIFEdipine XL (PROCARDIA XL) 90 mg 24 hr tablet    Atherosclerosis of both carotid arteries     Carotid CTA 9/9/2014 showed mild atherosclerosis of the right carotid bifurcation and 40% stenosis at the origin of the left internal carotid artery with heavily calcified plaque at this level. Carotid US 11/4/17 reports no significant stenosis.   --Continue high intensity statin, atorvastatin 40mg daily.   --Continue aspirin, but advised to decrease to 81mg.           RBBB (right bundle branch block)     ECG from today personally reviewed and discussed with the patient and his wife shows stable right bundle branch block.  The patient has had no syncopal episodes.    --No clinical bradyarrhythmias are noted.  Continue to monitor.   --Treat obstructive sleep apnea as below.         Relevant Medications    metoprolol succinate XL (TOPROL-XL) 100 mg 24 hr tablet    Type 2 diabetes mellitus with circulatory disorder, with long-term current use of insulin (CMS/[facility]) ([facility])    Relevant Medications    insulin glargine (BASAGLAR KWIKPEN) 100 unit/mL (3 mL) subcutaneous pen    insulin lispro protamin-lispro (HumaLOG Mix 75-25) 100 unit/mL (75-25) suspension    BASAGLAR KWIKPEN 100 unit/mL (3 mL) subcutaneous pen    HUMALOG KWIKPEN 100 unit/mL subcutaneous pen     Class 3 obesity due to excess calories without serious comorbidity with body mass index (BMI) of 40.0 to 44.9 in adult (CMS/[facility]) ([facility])     BMI 44.6.  --Encouraged weight loss.  --Needs to start walking or doing some exercise daily. Discussed at length.  --Consider bariatric surgery.         Peripheral arterial disease (CMS/[facility]) ([facility])     >75% left SFA with claudication.   --He is not exercising. Stressed the importance of walking.   --Followed by Dr. Wiley.   --Continue aspirin and statin.                   Crys Valencia MD, FACC   3/23/2018

## 2018-03-23 NOTE — ASSESSMENT & PLAN NOTE
>75% left SFA with claudication.   --He is not exercising. Stressed the importance of walking.   --Followed by Dr. Wiley.   --Continue aspirin and statin.

## 2018-05-22 ENCOUNTER — DOCUMENTATION (OUTPATIENT)
Dept: HEMATOLOGY/ONCOLOGY | Facility: HOSPITAL | Age: 68
End: 2018-05-22

## 2018-08-10 ENCOUNTER — TRANSCRIBE ORDERS (OUTPATIENT)
Dept: SCHEDULING | Age: 68
End: 2018-08-10

## 2018-08-10 DIAGNOSIS — R59.1 GENERALIZED ENLARGED LYMPH NODES: Primary | ICD-10-CM

## 2018-08-13 ENCOUNTER — FOLLOW UP (OUTPATIENT)
Dept: URBAN - METROPOLITAN AREA CLINIC 35 | Facility: CLINIC | Age: 68
End: 2018-08-13

## 2018-08-13 DIAGNOSIS — H34.11: ICD-10-CM

## 2018-08-13 DIAGNOSIS — H25.9: ICD-10-CM

## 2018-08-13 DIAGNOSIS — H40.9: ICD-10-CM

## 2018-08-13 DIAGNOSIS — H43.393: ICD-10-CM

## 2018-08-13 DIAGNOSIS — E11.9: ICD-10-CM

## 2018-08-13 PROCEDURE — 92014 COMPRE OPH EXAM EST PT 1/>: CPT

## 2018-08-13 ASSESSMENT — VISUAL ACUITY
OS_PH: 20/20-2
OS_SC: 20/30-2

## 2018-08-13 ASSESSMENT — TONOMETRY
OD_IOP_MMHG: 16
OS_IOP_MMHG: 16

## 2018-08-16 ENCOUNTER — HOSPITAL ENCOUNTER (OUTPATIENT)
Dept: RADIOLOGY | Facility: HOSPITAL | Age: 68
Discharge: HOME | End: 2018-08-16
Attending: INTERNAL MEDICINE
Payer: MEDICARE

## 2018-08-16 DIAGNOSIS — R59.1 GENERALIZED ENLARGED LYMPH NODES: ICD-10-CM

## 2018-08-16 PROCEDURE — 71250 CT THORAX DX C-: CPT

## 2018-09-18 RX ORDER — ATORVASTATIN CALCIUM 40 MG/1
40 TABLET, FILM COATED ORAL DAILY
Qty: 90 TABLET | Refills: 3 | Status: SHIPPED | OUTPATIENT
Start: 2018-09-18 | End: 2019-09-09 | Stop reason: SDUPTHER

## 2018-10-05 ENCOUNTER — HOSPITAL ENCOUNTER (OUTPATIENT)
Dept: CARDIOLOGY | Facility: HOSPITAL | Age: 68
Discharge: HOME | End: 2018-10-05
Attending: INTERNAL MEDICINE
Payer: MEDICARE

## 2018-10-05 ENCOUNTER — OFFICE VISIT (OUTPATIENT)
Dept: CARDIOLOGY | Facility: CLINIC | Age: 68
End: 2018-10-05
Payer: MEDICARE

## 2018-10-05 VITALS
DIASTOLIC BLOOD PRESSURE: 50 MMHG | HEART RATE: 74 BPM | SYSTOLIC BLOOD PRESSURE: 180 MMHG | BODY MASS INDEX: 45 KG/M2 | WEIGHT: 254 LBS | HEIGHT: 63 IN

## 2018-10-05 DIAGNOSIS — E11.59 TYPE 2 DIABETES MELLITUS WITH OTHER CIRCULATORY COMPLICATION, WITH LONG-TERM CURRENT USE OF INSULIN: ICD-10-CM

## 2018-10-05 DIAGNOSIS — I50.32 CHRONIC DIASTOLIC CONGESTIVE HEART FAILURE (CMS/HCC): ICD-10-CM

## 2018-10-05 DIAGNOSIS — I25.10 CORONARY ARTERY DISEASE INVOLVING NATIVE CORONARY ARTERY OF NATIVE HEART WITHOUT ANGINA PECTORIS: Primary | ICD-10-CM

## 2018-10-05 DIAGNOSIS — G47.33 OSA (OBSTRUCTIVE SLEEP APNEA): ICD-10-CM

## 2018-10-05 DIAGNOSIS — Z79.4 TYPE 2 DIABETES MELLITUS WITH OTHER CIRCULATORY COMPLICATION, WITH LONG-TERM CURRENT USE OF INSULIN: ICD-10-CM

## 2018-10-05 DIAGNOSIS — I73.9 PERIPHERAL ARTERIAL DISEASE (CMS/HCC): ICD-10-CM

## 2018-10-05 DIAGNOSIS — E78.2 MIXED HYPERLIPIDEMIA: ICD-10-CM

## 2018-10-05 DIAGNOSIS — E66.01 CLASS 3 SEVERE OBESITY DUE TO EXCESS CALORIES WITH SERIOUS COMORBIDITY AND BODY MASS INDEX (BMI) OF 45.0 TO 49.9 IN ADULT (CMS/HCC): ICD-10-CM

## 2018-10-05 DIAGNOSIS — I35.0 NONRHEUMATIC AORTIC VALVE STENOSIS: ICD-10-CM

## 2018-10-05 DIAGNOSIS — E66.813 CLASS 3 SEVERE OBESITY DUE TO EXCESS CALORIES WITH SERIOUS COMORBIDITY AND BODY MASS INDEX (BMI) OF 45.0 TO 49.9 IN ADULT (CMS/HCC): ICD-10-CM

## 2018-10-05 DIAGNOSIS — I27.20 PULMONARY HYPERTENSION (CMS/HCC): ICD-10-CM

## 2018-10-05 DIAGNOSIS — I65.23 ATHEROSCLEROSIS OF BOTH CAROTID ARTERIES: ICD-10-CM

## 2018-10-05 DIAGNOSIS — I10 ESSENTIAL HYPERTENSION: ICD-10-CM

## 2018-10-05 DIAGNOSIS — I45.10 RBBB (RIGHT BUNDLE BRANCH BLOCK): ICD-10-CM

## 2018-10-05 PROCEDURE — 93306 TTE W/DOPPLER COMPLETE: CPT | Mod: 26 | Performed by: INTERNAL MEDICINE

## 2018-10-05 PROCEDURE — 93306 TTE W/DOPPLER COMPLETE: CPT

## 2018-10-05 PROCEDURE — 99214 OFFICE O/P EST MOD 30 MIN: CPT | Performed by: INTERNAL MEDICINE

## 2018-10-05 PROCEDURE — 93000 ELECTROCARDIOGRAM COMPLETE: CPT | Performed by: INTERNAL MEDICINE

## 2018-10-05 RX ORDER — BETAMETHASONE DIPROPIONATE 0.5 MG/G
LOTION TOPICAL
Refills: 1 | COMMUNITY
Start: 2018-08-09

## 2018-10-05 RX ORDER — ALCLOMETASONE DIPROPIONATE 0.5 MG/G
CREAM TOPICAL
Refills: 1 | COMMUNITY
Start: 2018-08-09

## 2018-10-05 NOTE — PROGRESS NOTES
Crys Valencia MD, [facility]  Cardiology    [facility]    [facility]  [facility] [address] [facility]  [address] [place] [address]    TEL  [phone]  [facility]     October 5, 2018    Reason for visit: Cardiovascular Follow-Up.    Mr. Solorio is a 67 year old man with chronic diastolic heart failure, aortic stenosis status post bioprosthetic aortic valve replacement (5/14/15), nonobstructive coronary artery disease, carotid artery disease, hypertension, hyperlipidemia, diabetes, obstructive sleep apnea, and obesity; who presents for cardiovascular follow-up. I last saw him in the office on March 23, 2018 at which time he reported stable cardiovascular symptoms.  He was subsequently seen by his nephrologist, Dr. Frankel notes reviewed.    He underwent an echocardiogram prior to today's visit, which we reviewed together.    Today, Mr. Solorio returns for cardiovascular follow-up and is accompanied by his wife, who assists in history and decision-making. He continues to have significant dietary indiscretion. He is again following with Dr. Gibson, however does not stick to her diet plan.  His eating issues are clearly linked to depression, although he reports his mood is increased as he got his old job back.    Jorge Luis is not walking. He states he is limited by back pain and left leg claudication as well as hip pain. He denies chest pain, shortness of breath, or orthopnea. Lower extremity edema is stable, right greater than left. He is not wearing compression stockings, nor using his CPAP regularly. He denies palpitations, dizziness, lightheadedness, or syncope.  He has not had any further dizzy spells.  Oxygen saturation has been stable measuring 9394%.  His wife notes no further episodes of blue lips.    Diabetes has been controlled, most recent hemoglobin A1c 6.8%.  He now reports wearing BiPAP for 4-5 hours per night, which is a significant improvement.    and Mrs. Solorio are headed to [place] in a few weeks, which will require extensive walking.  Again we discussed the importance of regular exercise given his multiple medical comorbidities.    Past Medical History:  1. Aortic stenosis: s/p AVR (#23 [employer] Trifecta, 5/14/15)  2. Coronary artery disease: non-obstructive 30% prox LCx, 30% PDA  3. Hypertension  4. Hyperlipidemia  5. Carotid artery disease  6. Peripheral vascular disease: left SFA  7. Right bundle branch block  8. Obstructive sleep apnea: non-compliant with CPAP  9. CVA: Retinal stroke  10. Diabetes mellitus  11. Former tobacco abuse  12. GERD    Past Surgical History:  1. Aortic valve replacement: #23 [employer] Trifecta, 5/14/15, Dr. Quiroga    Medications (reviewed with patient and updated in EMR): aspirin 81mg daily, atorvastatin 40mg daily, torsemide 40mg daily, metoprolol succinate 150mg daily, nifedipine 90mg daily, potassium 20 mEq daily, amoxicillin 2g PRN dental procedures, insulin as directed, levothyroxine 75mcg daily, Nexium 40mg daily, sildenafil 25 mg as needed, vitamin B, co-Q 10, vitamin C.    Allergies: No known drug allergies.    Social History: . Two children. Works as an . Denies alcohol or illicit drug use.    Family History: Reviewed and noncontributory.    Review of Systems   Constitution: Positive for malaise/fatigue. Positive for weight gain.   HENT: Negative for nosebleeds.    Eyes: Negative for vision loss in left eye, vision loss in right eye and visual disturbance.   Cardiovascular: Positive for leg swelling. Negative for chest pain, cyanosis, dyspnea on exertion, irregular heartbeat, near-syncope, orthopnea, palpitations, paroxysmal nocturnal dyspnea and syncope.   Respiratory: Negative for cough, shortness of breath, snoring and wheezing.    Endocrine: Negative for polyuria.   Hematologic/Lymphatic: Negative for bleeding problem. Does not bruise/bleed easily.   Skin: Positive for color change  and rash.   Musculoskeletal: Positive for arthritis and back pain. Negative for myalgias.   Gastrointestinal: Negative for abdominal pain, hematochezia, melena, nausea and vomiting.   Genitourinary: Negative for hematuria.   Neurological: Negative for dizziness and light-headedness.   Psychiatric/Behavioral: Negative for altered mental status.  And depression.       Objective   Vitals:    10/05/18 1328   BP: 134/68   Pulse: 78   SpO2: (!) 90%     Body mass index is 45.42 kg/m².  Physical Exam   Constitutional: He appears well-developed and well-nourished. No distress.   HENT:   Head: Normocephalic.   Mouth/Throat: Mucous membranes are normal. Mucous membranes are not cyanotic.   Eyes: Conjunctivae are normal.   Neck: No JVD present. Carotid bruit is not present.   Cardiovascular: Normal rate, regular rhythm, S1 normal and S2 normal.   No extrasystoles are present. Exam reveals no gallop.    Murmur (2/6 SAIMA, 1/6 diastolic blowing at base) heard.  Pulses:       Carotid pulses are 2+ on the right side, and 2+ on the left side.       Radial pulses are 2+ on the right side, and 2+ on the left side.   Pulmonary/Chest: Effort normal. No respiratory distress. He has no wheezes. He has no rales.   Abdominal: Soft. Bowel sounds are normal. There is no tenderness.   Musculoskeletal: He exhibits edema, right greater than left.   Lymphadenopathy:     He has no cervical adenopathy.   Neurological: He is alert.   Skin: Skin is warm and dry. Rash (Chronic stasis changes.) noted. No cyanosis.   Psychiatric: He has a normal mood and affect. His speech is normal.     Labs:  Phlebotomy 5/16/18 (per Dr. Frankel's notes): Creatinine 1.5, LDL 81, A1c 6.8%, TSH 4.5.  From 3/13/17: Hemoglobin A1c 6.9, sodium 138, potassium 4.2, BUN 41, creatinine 1.53, GFR 46, total cholesterol 144, triglycerides 130, HDL 44, LDL 74. From 11/17/16: creatinine 1.6, GFR 44. My interpretation: stable renal insufficiency, normal potassium, low HDL, adequate  LDL, adequate hemoglobin A1c.    Cardiovascular Studies:  1. Echocardiogram 10/5/18: Mild concentric left ventricular hypertrophy with normal cavity size and preserved systolic function, EF 60%. Abnormal septal motion. Mildly dilated right ventricular cavity size with preserved systolic function. Status post #23 trifecta aortic valve prosthesis with peak velocity 2.5 m/s, mean gradient 16 mmHg. Mild to moderate aortic regurgitation. Mild mitral stenosis with mean gradient of 5 mmHg heart rate is 68.  Mild mitral regurgitation. Severe MAC. Unable to estimate right ventricular systolic pressure secondary to lack of TR jet.  Normal IVC with greater than 50% respiratory variation.  2. Echocardiogram 10/18/16: Mild concentric left ventricular hypertrophy with normal cavity size and preserved systolic function, EF 60%. Abnormal septal motion. Mildly dilated right ventricular cavity size with preserved systolic function. Status post #23 trifecta aortic valve prosthesis with peak velocity 2.2 m/s, mean gradient 11 mmHg. Mild aortic regurgitation. Mild mitral stenosis with mean gradient of 2 mmHg heart rate is 71. Trace mitral regurgitation. Severe MAC. Unable to estimate right ventricular systolic pressure secondary to lack of TR jet.  3. RHC 10/14/16: RA 16, RV 75/16, PA 75/27, PCWP 26, CO 5.7, CI 2.7, PA sat 58%.  4. NESHA 5/18/15 (personally reviewed): Post-AVR. Bioprosthetic AVR with peak velocity 1.98 m/s, mean gradient of 8 mmHg. No paravalvular regurgitation. Trace transvalvular regurgitation. Normal LV size and systolic function. Normal RV size and systolic function.   5. Coronary angiogram 3/9/15 ([facility]): patent LMCA, 30% prox LCx, 30% PDA.   6. Carotid CTA 9/9/2014: Mild atherosclerotic changes of the right carotid bifurcation with out evidence for focal areas of stenosis at the origin of the right ICA. Approximately 40% stenosis at the origin of the left ICA given by heavily calcified plaque at this level.  Patent b/l vertebral arteries w/o focal areas of stenosis.    ECG from today personally reviewed and discussed with the patient shows sinus rhythm with right bundle branch block, left atrial enlargement.    Assessment/Plan   Problem List Items Addressed This Visit     Pulmonary hypertension (CMS/[facility]) ([facility])     [facility] 10/14/16: RA 16, RV 75/16, PA 75/27, PCWP 26, CO 5.7, CI 2.7, PA sat 58%.  Secondary to diastolic heart failure and underlying obesity hypoventilation syndrome.  --Continue diuresis.  --Encouraged weight loss.         Relevant Orders    ECG 12 LEAD-OFFICE PERFORMED (Completed)    Chronic diastolic congestive heart failure (CMS/[facility]) ([facility])     Hospitalized October 14-24, 2016 for acute on chronic diastolic heart failure with aggressive diuresis with IV Bumex and ultimate transition to PO torsemide prior to discharge. Notably, admission weight was 244 pounds and discharge weight 225 pounds.   He is 256 pounds on exam today, although reports significant dietary indiscretion.  As previously discussed, dyspnea and hypoxemia are likely multifactorial: secondary to chronic diastolic heart failure, untreated sleep apnea, and restrictive lung disease in the setting of obesity.   Echocardiogram from today personally reviewed and discussed with the patient shows normal sized inferior vena cava with than 50% respiratory variation, consistent with normal right atrial pressure.  --Continue torsemide 40mg TWICE daily.   --Monitor home weights and call with increase >2 pounds in 24 hours.   --Stressed fluid and dietary compliance. CHF education provided.   --Stressed at length the importance of compliance with BiPAP, which has been an ongoing struggle for him.   --Also recommend compression socks, knee length.   --CHF diet discussed at length.          Coronary artery disease involving native coronary artery of native heart without angina pectoris - Primary     Coronary angiogram 3/9/15 ([facility]): patent LMCA, 30% prox  LCx, 30% PDA.    Asymptomatic.   --Continue aspirin, high intensity statin, and beta blocker.           Relevant Orders    ECG 12 LEAD-OFFICE PERFORMED (Completed)    Essential hypertension     Blood pressure mildly elevated, measuring 134/68 mmHg on exam.  --Continue current regimen for now.  --Consider addition of Imdur/hydralazine if remains >130/80 mmHg.         Relevant Orders    ECG 12 LEAD-OFFICE PERFORMED (Completed)    Mixed hyperlipidemia     The patient has associated coronary and peripheral artery disease and diabetes.    He is tolerating lipid lowering therapy well.    Lipids from 3/13/17: total cholesterol 144, triglycerides 130, HDL 44, LDL 74.  --Continue high-intensity statin, atorvastatin 40 mg daily.   --Goal LDL <70.         FREDRICK (obstructive sleep apnea)     Improved compliance with BiPAP although remains suboptimal.   --Again stressed the importance of compliance with BiPAP therapy and risk of heart failure, severe hypertension, arrhythmia, stroke, and death without treatment. He reports understanding and will attempt to increase use. Follow-up with sleep medicine.   --Discussed lifestyle modification and need for weight loss. Recommend consideration of bariatric surgery as a majority of his medical issues are secondary to obesity.         Nonrheumatic aortic valve stenosis     Status post #23 [employer] Trifecta, 5/14/15 with Dr. Quiroga.   Echocardiogram from 10/5/18: Status post #23 trifecta aortic valve prosthesis with peak velocity 2.5 m/s, mean gradient 16 mmHg. Mild to moderate aortic regurgitation.   Increased degree of aortic regurgitation.  --Repeat echocardiogram in 1 year, October 2019 to monitor aortic regurgitation.  --Continue aspirin and statin.   --Patient aware of need for antibiotic prophylaxis prior to dental procedures, has a prescription for amoxicillin.         Atherosclerosis of both carotid arteries     Carotid CTA 9/9/2014 showed mild atherosclerosis of the right carotid  bifurcation and 40% stenosis at the origin of the left internal carotid artery with heavily calcified plaque at this level. Carotid US 11/4/17 reports no significant stenosis.   --Continue high intensity statin, atorvastatin 40mg daily.   --Continue aspirin 81mg.    --Consider repeat US in 2-3 years.         RBBB (right bundle branch block)     ECG from today personally reviewed and discussed with the patient and his wife shows stable right bundle branch block.  The patient has had no syncopal episodes.    --No clinical bradyarrhythmias are noted.  Continue to monitor.   --Treat obstructive sleep apnea as below.         Type 2 diabetes mellitus with circulatory disorder, with long-term current use of insulin (CMS/[facility])     Acceptable hemoglobin A1c measuring 6.8% on 5/16/18.  Insulin-dependent.  --Management as per Dr. Solorio.         Class 3 severe obesity due to excess calories with serious comorbidity and body mass index (BMI) of 45.0 to 49.9 in adult (CMS/HCC)     BMI 45.4.  --Encouraged weight loss.  --Needs to start walking or doing some exercise daily. Discussed at length.  --Consider bariatric surgery, however we discussed that the his obesity is rooted in depression. Strongly recommend he resume therapy.          Peripheral arterial disease (CMS/HCC) (HCC)     >75% left SFA with claudication.   --He is not exercising. Stressed the importance of walking.   --Followed by Dr. Wiley.   --Continue aspirin and statin.                 Return in about 6 months (around 4/5/2019).    Crys Valencia MD, [facility]

## 2018-10-05 NOTE — LETTER
October 9, 2018     Alan Solorio, DO  [address] [facility] [address]    Patient: Jimmie Solorio   YOB: 1950   Date of Visit: 10/5/2018       Dear Dr. Solorio:    Thank you for referring Jimmie Solorio to me for evaluation. Below are my notes for this consultation.    If you have questions, please do not hesitate to call me. I look forward to following your patient along with you.         Sincerely,        Crys Valencia MD        CC: No Recipients  Crys Valencia MD  10/9/2018  8:01 AM  Sign at close encounter   Crys Valencia MD, [facility]  Cardiology    [facility]  [facility] Level  [address] [place] [address]    TEL  [phone]  [facility]     October 5, 2018    Reason for visit: Cardiovascular Follow-Up.    Mr. Solorio is a 67 year old man with chronic diastolic heart failure, aortic stenosis status post bioprosthetic aortic valve replacement (5/14/15), nonobstructive coronary artery disease, carotid artery disease, hypertension, hyperlipidemia, diabetes, obstructive sleep apnea, and obesity; who presents for cardiovascular follow-up. I last saw him in the office on March 23, 2018 at which time he reported stable cardiovascular symptoms.  He was subsequently seen by his nephrologist, Dr. Frankel notes reviewed.    Today, Mr. Solorio returns for cardiovascular follow-up and is accompanied by his wife, who assists in history and decision-making. He continues to have significant dietary indiscretion. He is again following with Dr. Gibson, however does not stick to her diet plan.  His eating issues are clearly linked to depression, although he reports his mood is increased as he got his old job back.    Jorge Luis is not walking. He states he is limited by back pain and left leg claudication as well as hip pain. He denies chest pain, shortness of breath, or orthopnea. Lower extremity edema is stable,  right greater than left. He is not wearing compression stockings, nor using his CPAP regularly. He denies palpitations, dizziness, lightheadedness, or syncope.  He has not had any further dizzy spells.  Oxygen saturation has been stable measuring 9394%.  His wife notes no further episodes of blue lips.    Diabetes has been controlled, most recent hemoglobin A1c 6.8%.  He now reports wearing BiPAP for 4-5 hours per night, which is a significant improvement.   and Mrs. Solorio are headed to [place] in a few weeks, which will require extensive walking.  Again we discussed the importance of regular exercise given his multiple medical comorbidities.    Past Medical History:  1. Aortic stenosis: s/p AVR (#23 [employer] Trifecta, 5/14/15)  2. Coronary artery disease: non-obstructive 30% prox LCx, 30% PDA  3. Hypertension  4. Hyperlipidemia  5. Carotid artery disease  6. Peripheral vascular disease: left SFA  7. Right bundle branch block  8. Obstructive sleep apnea: non-compliant with CPAP  9. CVA: Retinal stroke  10. Diabetes mellitus  11. Former tobacco abuse  12. GERD    Past Surgical History:  1. Aortic valve replacement: #23 [employer] Trifecta, 5/14/15, Dr. Quiroga    Medications (reviewed with patient and updated in EMR): aspirin 81mg daily, atorvastatin 40mg daily, torsemide 40mg daily, metoprolol succinate 150mg daily, nifedipine 90mg daily, potassium 20 mEq daily, amoxicillin 2g PRN dental procedures, insulin as directed, levothyroxine 75mcg daily, Nexium 40mg daily, sildenafil 25 mg as needed, vitamin B, co-Q 10, vitamin C.    Allergies: No known drug allergies.    Social History: . Two children. Works as an . Denies alcohol or illicit drug use.    Family History: Reviewed and noncontributory.    Review of Systems   Constitution: Positive for malaise/fatigue. Positive for weight gain.   HENT: Negative for nosebleeds.    Eyes: Negative for vision loss in left eye, vision loss in right  eye and visual disturbance.   Cardiovascular: Positive for leg swelling. Negative for chest pain, cyanosis, dyspnea on exertion, irregular heartbeat, near-syncope, orthopnea, palpitations, paroxysmal nocturnal dyspnea and syncope.   Respiratory: Negative for cough, shortness of breath, snoring and wheezing.    Endocrine: Negative for polyuria.   Hematologic/Lymphatic: Negative for bleeding problem. Does not bruise/bleed easily.   Skin: Positive for color change and rash.   Musculoskeletal: Positive for arthritis and back pain. Negative for myalgias.   Gastrointestinal: Negative for abdominal pain, hematochezia, melena, nausea and vomiting.   Genitourinary: Negative for hematuria.   Neurological: Negative for dizziness and light-headedness.   Psychiatric/Behavioral: Negative for altered mental status.  And depression.       Objective   Vitals:    10/05/18 1328   BP: 134/68   Pulse: 78   SpO2: (!) 90%     Body mass index is 45.42 kg/m².  Physical Exam   Constitutional: He appears well-developed and well-nourished. No distress.   HENT:   Head: Normocephalic.   Mouth/Throat: Mucous membranes are normal. Mucous membranes are not cyanotic.   Eyes: Conjunctivae are normal.   Neck: No JVD present. Carotid bruit is not present.   Cardiovascular: Normal rate, regular rhythm, S1 normal and S2 normal.   No extrasystoles are present. Exam reveals no gallop.    Murmur (2/6 SAIMA, 1/6 diastolic blowing at base) heard.  Pulses:       Carotid pulses are 2+ on the right side, and 2+ on the left side.       Radial pulses are 2+ on the right side, and 2+ on the left side.   Pulmonary/Chest: Effort normal. No respiratory distress. He has no wheezes. He has no rales.   Abdominal: Soft. Bowel sounds are normal. There is no tenderness.   Musculoskeletal: He exhibits edema, right greater than left.   Lymphadenopathy:     He has no cervical adenopathy.   Neurological: He is alert.   Skin: Skin is warm and dry. Rash (Chronic stasis changes.)  noted. No cyanosis.   Psychiatric: He has a normal mood and affect. His speech is normal.     Labs:  Phlebotomy 5/16/18 (per Dr. Frankel's notes): Creatinine 1.5, LDL 81, A1c 6.8%, TSH 4.5.  From 3/13/17: Hemoglobin A1c 6.9, sodium 138, potassium 4.2, BUN 41, creatinine 1.53, GFR 46, total cholesterol 144, triglycerides 130, HDL 44, LDL 74. From 11/17/16: creatinine 1.6, GFR 44. My interpretation: stable renal insufficiency, normal potassium, low HDL, adequate LDL, adequate hemoglobin A1c.    Cardiovascular Studies:  1. Echocardiogram 10/5/18: Mild concentric left ventricular hypertrophy with normal cavity size and preserved systolic function, EF 60%. Abnormal septal motion. Mildly dilated right ventricular cavity size with preserved systolic function. Status post #23 trifecta aortic valve prosthesis with peak velocity 2.5 m/s, mean gradient 16 mmHg. Mild to moderate aortic regurgitation. Mild mitral stenosis with mean gradient of 5 mmHg heart rate is 68.  Mild mitral regurgitation. Severe MAC. Unable to estimate right ventricular systolic pressure secondary to lack of TR jet.  Normal IVC with greater than 50% respiratory variation.  2. Echocardiogram 10/18/16: Mild concentric left ventricular hypertrophy with normal cavity size and preserved systolic function, EF 60%. Abnormal septal motion. Mildly dilated right ventricular cavity size with preserved systolic function. Status post #23 trifecta aortic valve prosthesis with peak velocity 2.2 m/s, mean gradient 11 mmHg. Mild aortic regurgitation. Mild mitral stenosis with mean gradient of 2 mmHg heart rate is 71. Trace mitral regurgitation. Severe MAC. Unable to estimate right ventricular systolic pressure secondary to lack of TR jet.  3. RHC 10/14/16: RA 16, RV 75/16, PA 75/27, PCWP 26, CO 5.7, CI 2.7, PA sat 58%.  4. NESHA 5/18/15 (personally reviewed): Post-AVR. Bioprosthetic AVR with peak velocity 1.98 m/s, mean gradient of 8 mmHg. No paravalvular regurgitation.  Trace transvalvular regurgitation. Normal LV size and systolic function. Normal RV size and systolic function.   5. Coronary angiogram 3/9/15 ([place]): patent LMCA, 30% prox LCx, 30% PDA.   6. Carotid CTA 9/9/2014: Mild atherosclerotic changes of the right carotid bifurcation with out evidence for focal areas of stenosis at the origin of the right ICA. Approximately 40% stenosis at the origin of the left ICA given by heavily calcified plaque at this level. Patent b/l vertebral arteries w/o focal areas of stenosis.    ECG from today personally reviewed and discussed with the patient shows sinus rhythm with right bundle branch block, left atrial enlargement.    Assessment/Plan   Problem List Items Addressed This Visit     Pulmonary hypertension (CMS/[facility]) ([facility])     [facility] 10/14/16: RA 16, RV 75/16, PA 75/27, PCWP 26, CO 5.7, CI 2.7, PA sat 58%.  Secondary to diastolic heart failure and underlying obesity hypoventilation syndrome.  --Continue diuresis.  --Encouraged weight loss.         Relevant Orders    ECG 12 LEAD-OFFICE PERFORMED (Completed)    Chronic diastolic congestive heart failure (CMS/[facility]) ([facility])     Hospitalized October 14-24, 2016 for acute on chronic diastolic heart failure with aggressive diuresis with IV Bumex and ultimate transition to PO torsemide prior to discharge. Notably, admission weight was 244 pounds and discharge weight 225 pounds.   He is 256 pounds on exam today, although reports significant dietary indiscretion.  As previously discussed, dyspnea and hypoxemia are likely multifactorial: secondary to chronic diastolic heart failure, untreated sleep apnea, and restrictive lung disease in the setting of obesity.   Echocardiogram from today personally reviewed and discussed with the patient shows normal sized inferior vena cava with than 50% respiratory variation, consistent with normal right atrial pressure.  --Continue torsemide 40mg TWICE daily.   --Monitor home weights and call with increase  >2 pounds in 24 hours.   --Stressed fluid and dietary compliance. CHF education provided.   --Stressed at length the importance of compliance with BiPAP, which has been an ongoing struggle for him.   --Also recommend compression socks, knee length.   --CHF diet discussed at length.          Coronary artery disease involving native coronary artery of native heart without angina pectoris - Primary     Coronary angiogram 3/9/15 ([place]): patent LMCA, 30% prox LCx, 30% PDA.    Asymptomatic.   --Continue aspirin, high intensity statin, and beta blocker.           Relevant Orders    ECG 12 LEAD-OFFICE PERFORMED (Completed)    Essential hypertension     Blood pressure mildly elevated, measuring 134/68 mmHg on exam.  --Continue current regimen for now.  --Consider addition of Imdur/hydralazine if remains >130/80 mmHg.         Relevant Orders    ECG 12 LEAD-OFFICE PERFORMED (Completed)    Mixed hyperlipidemia     The patient has associated coronary and peripheral artery disease and diabetes.    He is tolerating lipid lowering therapy well.    Lipids from 3/13/17: total cholesterol 144, triglycerides 130, HDL 44, LDL 74.  --Continue high-intensity statin, atorvastatin 40 mg daily.   --Goal LDL <70.         FREDRICK (obstructive sleep apnea)     Improved compliance with BiPAP although remains suboptimal.   --Again stressed the importance of compliance with BiPAP therapy and risk of heart failure, severe hypertension, arrhythmia, stroke, and death without treatment. He reports understanding and will attempt to increase use. Follow-up with sleep medicine.   --Discussed lifestyle modification and need for weight loss. Recommend consideration of bariatric surgery as a majority of his medical issues are secondary to obesity.         Nonrheumatic aortic valve stenosis     Status post #23 [employer] Trifecta, 5/14/15 with Dr. Quiroga.   Echocardiogram from 10/5/18: Status post #23 trifecta aortic valve prosthesis with peak velocity 2.5  m/s, mean gradient 16 mmHg. Mild to moderate aortic regurgitation.   Increased degree of aortic regurgitation.  --Repeat echocardiogram in 1 year, October 2019 to monitor aortic regurgitation.  --Continue aspirin and statin.   --Patient aware of need for antibiotic prophylaxis prior to dental procedures, has a prescription for amoxicillin.         Atherosclerosis of both carotid arteries     Carotid CTA 9/9/2014 showed mild atherosclerosis of the right carotid bifurcation and 40% stenosis at the origin of the left internal carotid artery with heavily calcified plaque at this level. Carotid US 11/4/17 reports no significant stenosis.   --Continue high intensity statin, atorvastatin 40mg daily.   --Continue aspirin 81mg.    --Consider repeat US in 2-3 years.         RBBB (right bundle branch block)     ECG from today personally reviewed and discussed with the patient and his wife shows stable right bundle branch block.  The patient has had no syncopal episodes.    --No clinical bradyarrhythmias are noted.  Continue to monitor.   --Treat obstructive sleep apnea as below.         Type 2 diabetes mellitus with circulatory disorder, with long-term current use of insulin (CMS/[facility])     Acceptable hemoglobin A1c measuring 6.8% on 5/16/18.  Insulin-dependent.  --Management as per Dr. Solorio.         Class 3 severe obesity due to excess calories with serious comorbidity and body mass index (BMI) of 45.0 to 49.9 in adult (CMS/[facility])     BMI 45.4.  --Encouraged weight loss.  --Needs to start walking or doing some exercise daily. Discussed at length.  --Consider bariatric surgery, however we discussed that the his obesity is rooted in depression. Strongly recommend he resume therapy.          Peripheral arterial disease (CMS/[facility]) (HCC)     >75% left SFA with claudication.   --He is not exercising. Stressed the importance of walking.   --Followed by Dr. Wiley.   --Continue aspirin and statin.                 Return in about 6  months (around 4/5/2019).    Crys Valencia MD, FACC

## 2018-10-08 LAB
AORTIC ROOT ANNULUS - M-MODE: 3.59 CM
AORTIC VALVE AREA: 0.79 SQUARE CENTIMETERS PER SQUARE METER
AORTIC VALVE AT: 111.88 MS
AORTIC VALVE MEAN VELOCITY: 1.94 CENTIMETERS PER SECOND
AORTIC VALVE VELOCITY TIME INTEGRAL: 58.6 CM
AV MEAN GRADIENT: 16 MMHG
AV PEAK GRADIENT: 26 MMHG
AV PEAK VELOCITY-S: 2.55 METERS PER SECOND
BSA FOR ECHO PROCEDURE: 2.26 M2
CUSP SEPARATION: 2.24 CM
DOP CALC LVOT STROKE VOLUME: 92.38 ML
DOP CALC RVOT VTI: 13.24 CM
E WAVE DECELERATION TIME: 184.44 MS
E/A RATIO: 1.45
E/E' RATIO: 22.82
E/LAT E' RATIO: 21.19
EDV (BP): 91.62 MILLILITER
EF (A4C): 69.47 PERCENT
EJECTION FRACTION: 60.18 PERCENT
ESV (BP): 36.48 MILLILITER
HEART RATE: 68 BPM
INTERVENTRICULAR SEPTUM: 1.24 CM
LAD 2D - M-MODE: 5.55 CM
LAV-S: 109.5 MILLILITER
LEFT INTERNAL DIMENSION IN SYSTOLE: 2.88 CM (ref 3.41–5.17)
LEFT VENTRICLE DIASTOLIC VOLUME: 109.4 MILLILITER
LEFT VENTRICLE SYSTOLIC VOLUME: 33.4 MILLILITER
LEFT VENTRICULAR INTERNAL DIMENSION IN DIASTOLE: 4.59 CM (ref 5.84–8.12)
LEFT VENTRICULAR MASS: 197.55 GRAM
LEFT VENTRICULAR POSTERIOR WALL IN END DIASTOLE: 1.11 CM (ref 0.73–1.37)
LV DIASTOLIC VOLUME: 71.8 MILLILITER
LVCI: 1.78 LITERS PER MINUTE PER SQUARE METER
LVCO: 3.8 LITERS PER MINUTE
LVOT 2D: 2.08 CM
LVOT A: 3.41 SQUARE CENTIMETER
LVOT MG: 3.05 MMHG
LVOT MV: 0.82 METERS PER SECOND
LVOT PEAK VELOCITY: 1.11 METERS PER SECOND
LVOT PG: 4.93 MMHG
LVOT VTI: 27.2 CM
MV E'TISSUE VEL-LAT: 0.08 METERS PER SECOND
MV E'TISSUE VEL-MED: 0.07 METERS PER SECOND
MV MEAN GRADIENT: 5 MMHG
MV PEAK A VEL: 1.12 METERS PER SECOND
MV PEAK E VEL: 1.63 METERS PER SECOND
MV VALVE AREA P 1/2 METHOD: 4.11 SQUARE CENTIMETER
POSTERIOR WALL: 1.11 CM
PULM VEIN S/D RATIO: 0.61
PV PEAK D VEL: 0.68 METERS PER SECOND
PV PEAK S VEL: 0.42 METERS PER SECOND
RAP: 3 MMHG
RVOT VMAX: 0.75 M/S
RVOT VTI: 13.2 CM
SEPTAL TISSUE DOPPLER FREE WALL LATE DIA VELOCITY (APICAL 4 CHAMBER VIEW): 0.12 M/S
Z-SCORE OF LEFT VENTRICULAR DIMENSION IN END DIASTOLE: -4.06
Z-SCORE OF LEFT VENTRICULAR DIMENSION IN END SYSTOLE: -2.98
Z-SCORE OF LEFT VENTRICULAR POSTERIOR WALL IN END DIASTOLE: 0.55

## 2018-10-09 VITALS
DIASTOLIC BLOOD PRESSURE: 68 MMHG | HEART RATE: 78 BPM | WEIGHT: 256.4 LBS | HEIGHT: 63 IN | SYSTOLIC BLOOD PRESSURE: 134 MMHG | OXYGEN SATURATION: 90 % | BODY MASS INDEX: 45.43 KG/M2

## 2018-10-09 PROBLEM — E66.813 CLASS 3 SEVERE OBESITY DUE TO EXCESS CALORIES WITH SERIOUS COMORBIDITY AND BODY MASS INDEX (BMI) OF 45.0 TO 49.9 IN ADULT (CMS/HCC): Status: ACTIVE | Noted: 2018-03-23

## 2018-10-09 PROBLEM — E66.01 CLASS 3 SEVERE OBESITY DUE TO EXCESS CALORIES WITH SERIOUS COMORBIDITY AND BODY MASS INDEX (BMI) OF 45.0 TO 49.9 IN ADULT (CMS/HCC): Status: ACTIVE | Noted: 2018-03-23

## 2018-10-09 PROBLEM — I50.32 CHRONIC DIASTOLIC CONGESTIVE HEART FAILURE (CMS/HCC): Status: ACTIVE | Noted: 2018-03-23

## 2018-10-09 NOTE — ASSESSMENT & PLAN NOTE
Blood pressure mildly elevated, measuring 134/68 mmHg on exam.  --Continue current regimen for now.  --Consider addition of Imdur/hydralazine if remains >130/80 mmHg.

## 2018-10-09 NOTE — ASSESSMENT & PLAN NOTE
Acceptable hemoglobin A1c measuring 6.8% on 5/16/18.  Insulin-dependent.  --Management as per Dr. Solorio.

## 2018-10-09 NOTE — ASSESSMENT & PLAN NOTE
Status post #23 [employer] Trifecta, 5/14/15 with Dr. Quiroga.   Echocardiogram from 10/5/18: Status post #23 trifecta aortic valve prosthesis with peak velocity 2.5 m/s, mean gradient 16 mmHg. Mild to moderate aortic regurgitation.   Increased degree of aortic regurgitation.  --Repeat echocardiogram in 1 year, October 2019 to monitor aortic regurgitation.  --Continue aspirin and statin.   --Patient aware of need for antibiotic prophylaxis prior to dental procedures, has a prescription for amoxicillin.

## 2018-10-09 NOTE — ASSESSMENT & PLAN NOTE
The patient has associated coronary and peripheral artery disease and diabetes.    He is tolerating lipid lowering therapy well.    Lipids from 3/13/17: total cholesterol 144, triglycerides 130, HDL 44, LDL 74.  --Continue high-intensity statin, atorvastatin 40 mg daily.   --Goal LDL <70.

## 2018-10-09 NOTE — ASSESSMENT & PLAN NOTE
Carotid CTA 9/9/2014 showed mild atherosclerosis of the right carotid bifurcation and 40% stenosis at the origin of the left internal carotid artery with heavily calcified plaque at this level. Carotid US 11/4/17 reports no significant stenosis.   --Continue high intensity statin, atorvastatin 40mg daily.   --Continue aspirin 81mg.    --Consider repeat US in 2-3 years.

## 2018-10-09 NOTE — ASSESSMENT & PLAN NOTE
Hospitalized October 14-24, 2016 for acute on chronic diastolic heart failure with aggressive diuresis with IV Bumex and ultimate transition to PO torsemide prior to discharge. Notably, admission weight was 244 pounds and discharge weight 225 pounds.   He is 256 pounds on exam today, although reports significant dietary indiscretion.  As previously discussed, dyspnea and hypoxemia are likely multifactorial: secondary to chronic diastolic heart failure, untreated sleep apnea, and restrictive lung disease in the setting of obesity.   Echocardiogram from today personally reviewed and discussed with the patient shows normal sized inferior vena cava with than 50% respiratory variation, consistent with normal right atrial pressure.  --Continue torsemide 40mg TWICE daily.   --Monitor home weights and call with increase >2 pounds in 24 hours.   --Stressed fluid and dietary compliance. CHF education provided.   --Stressed at length the importance of compliance with BiPAP, which has been an ongoing struggle for him.   --Also recommend compression socks, knee length.   --CHF diet discussed at length.

## 2018-10-09 NOTE — ASSESSMENT & PLAN NOTE
Coronary angiogram 3/9/15 ([place]): patent LMCA, 30% prox LCx, 30% PDA.    Asymptomatic.   --Continue aspirin, high intensity statin, and beta blocker.

## 2018-10-09 NOTE — ASSESSMENT & PLAN NOTE
[facility] 10/14/16: RA 16, RV 75/16, PA 75/27, PCWP 26, CO 5.7, CI 2.7, PA sat 58%.  Secondary to diastolic heart failure and underlying obesity hypoventilation syndrome.  --Continue diuresis.  --Encouraged weight loss.

## 2018-10-09 NOTE — ASSESSMENT & PLAN NOTE
Improved compliance with BiPAP although remains suboptimal.   --Again stressed the importance of compliance with BiPAP therapy and risk of heart failure, severe hypertension, arrhythmia, stroke, and death without treatment. He reports understanding and will attempt to increase use. Follow-up with sleep medicine.   --Discussed lifestyle modification and need for weight loss. Recommend consideration of bariatric surgery as a majority of his medical issues are secondary to obesity.

## 2018-10-09 NOTE — ASSESSMENT & PLAN NOTE
BMI 45.4.  --Encouraged weight loss.  --Needs to start walking or doing some exercise daily. Discussed at length.  --Consider bariatric surgery, however we discussed that the his obesity is rooted in depression. Strongly recommend he resume therapy.

## 2019-02-18 ENCOUNTER — FOLLOW UP (OUTPATIENT)
Dept: URBAN - METROPOLITAN AREA CLINIC 35 | Facility: CLINIC | Age: 69
End: 2019-02-18

## 2019-02-18 DIAGNOSIS — H25.9: ICD-10-CM

## 2019-02-18 DIAGNOSIS — H34.11: ICD-10-CM

## 2019-02-18 DIAGNOSIS — E11.9: ICD-10-CM

## 2019-02-18 DIAGNOSIS — H43.393: ICD-10-CM

## 2019-02-18 DIAGNOSIS — H40.9: ICD-10-CM

## 2019-02-18 PROCEDURE — 92014 COMPRE OPH EXAM EST PT 1/>: CPT

## 2019-02-18 ASSESSMENT — TONOMETRY
OD_IOP_MMHG: 19
OS_IOP_MMHG: 15

## 2019-02-18 ASSESSMENT — VISUAL ACUITY
OS_CC: 20/30-1
OS_PH: 20/20-2

## 2019-03-20 RX ORDER — NIFEDIPINE 90 MG/1
TABLET, EXTENDED RELEASE ORAL
Qty: 90 TABLET | Refills: 3 | Status: SHIPPED | OUTPATIENT
Start: 2019-03-20

## 2019-04-01 RX ORDER — TORSEMIDE 20 MG/1
40 TABLET ORAL 2 TIMES DAILY
Qty: 180 TABLET | Refills: 3 | Status: SHIPPED | OUTPATIENT
Start: 2019-04-01 | End: 2019-07-09 | Stop reason: SDUPTHER

## 2019-04-11 ENCOUNTER — OFFICE VISIT (OUTPATIENT)
Dept: CARDIOLOGY | Facility: CLINIC | Age: 69
End: 2019-04-11
Payer: COMMERCIAL

## 2019-04-11 VITALS
HEART RATE: 76 BPM | RESPIRATION RATE: 17 BRPM | DIASTOLIC BLOOD PRESSURE: 58 MMHG | SYSTOLIC BLOOD PRESSURE: 138 MMHG | OXYGEN SATURATION: 98 % | HEIGHT: 64 IN | BODY MASS INDEX: 43.54 KG/M2 | WEIGHT: 255 LBS

## 2019-04-11 DIAGNOSIS — I35.0 NONRHEUMATIC AORTIC VALVE STENOSIS: ICD-10-CM

## 2019-04-11 DIAGNOSIS — G47.33 OSA (OBSTRUCTIVE SLEEP APNEA): ICD-10-CM

## 2019-04-11 DIAGNOSIS — I10 ESSENTIAL HYPERTENSION: ICD-10-CM

## 2019-04-11 DIAGNOSIS — E11.59 TYPE 2 DIABETES MELLITUS WITH OTHER CIRCULATORY COMPLICATION, WITH LONG-TERM CURRENT USE OF INSULIN: ICD-10-CM

## 2019-04-11 DIAGNOSIS — I50.32 CHRONIC DIASTOLIC CONGESTIVE HEART FAILURE (CMS/HCC): Primary | ICD-10-CM

## 2019-04-11 DIAGNOSIS — I65.23 ATHEROSCLEROSIS OF BOTH CAROTID ARTERIES: ICD-10-CM

## 2019-04-11 DIAGNOSIS — E78.2 MIXED HYPERLIPIDEMIA: ICD-10-CM

## 2019-04-11 DIAGNOSIS — I45.10 RBBB (RIGHT BUNDLE BRANCH BLOCK): ICD-10-CM

## 2019-04-11 DIAGNOSIS — I27.20 PULMONARY HYPERTENSION (CMS/HCC): ICD-10-CM

## 2019-04-11 DIAGNOSIS — E66.01 CLASS 3 SEVERE OBESITY DUE TO EXCESS CALORIES WITH SERIOUS COMORBIDITY AND BODY MASS INDEX (BMI) OF 45.0 TO 49.9 IN ADULT (CMS/HCC): ICD-10-CM

## 2019-04-11 DIAGNOSIS — Z79.4 TYPE 2 DIABETES MELLITUS WITH OTHER CIRCULATORY COMPLICATION, WITH LONG-TERM CURRENT USE OF INSULIN: ICD-10-CM

## 2019-04-11 DIAGNOSIS — I25.10 CORONARY ARTERY DISEASE INVOLVING NATIVE CORONARY ARTERY OF NATIVE HEART WITHOUT ANGINA PECTORIS: ICD-10-CM

## 2019-04-11 DIAGNOSIS — I73.9 PERIPHERAL ARTERIAL DISEASE (CMS/HCC): ICD-10-CM

## 2019-04-11 DIAGNOSIS — E66.813 CLASS 3 SEVERE OBESITY DUE TO EXCESS CALORIES WITH SERIOUS COMORBIDITY AND BODY MASS INDEX (BMI) OF 45.0 TO 49.9 IN ADULT (CMS/HCC): ICD-10-CM

## 2019-04-11 PROCEDURE — 99214 OFFICE O/P EST MOD 30 MIN: CPT | Performed by: INTERNAL MEDICINE

## 2019-04-11 NOTE — ASSESSMENT & PLAN NOTE
>75% left SFA with claudication.   Stable claudication.  --He is not exercising. Stressed the importance of walking.   --Followed by Dr. Wiley.   --Continue aspirin and statin.

## 2019-04-11 NOTE — ASSESSMENT & PLAN NOTE
The patient has had no syncopal episodes.    No clinical bradyarrhythmias are noted.    --Continue to monitor.   --Treat obstructive sleep apnea as above.

## 2019-04-11 NOTE — ASSESSMENT & PLAN NOTE
Blood pressure mildly elevated, measuring 138/58 mmHg on exam.  --Continue current regimen for now.  --Consider addition of Imdur/hydralazine if remains >130/80 mmHg.

## 2019-04-11 NOTE — ASSESSMENT & PLAN NOTE
BMI 43.8.  --Encouraged weight loss.  --Needs to start walking or doing some exercise daily.  Lifestyle modification discussed at length.

## 2019-04-11 NOTE — ASSESSMENT & PLAN NOTE
Coronary angiogram 3/9/15 ([place]): patent LMCA, 30% prox LCx, 30% PDA.    Asymptomatic.   --Continue aspirin, high intensity statin, and beta blocker.

## 2019-04-11 NOTE — LETTER
"April 11, 2019     Alan Solorio, DO  [address] [facility] [id number]    Patient: Jimmie Solorio   YOB: 1950   Date of Visit: 4/11/2019       Dear Dr. Solorio:    Thank you for referring Jimmie Solorio to me for evaluation. Below are my notes for this consultation.    If you have questions, please do not hesitate to call me. I look forward to following your patient along with you.         Sincerely,        Crys Valencia MD        CC: No Recipients  Crys Valencia MD  4/11/2019 12:41 PM  Sign at close encounter   Crys Valencia MD, [facility]  Cardiology    [facility]  [facility] Level  [address] [place] [address]    TEL  [phone]  [facility]     April 11, 2019    Reason for visit: Cardiovascular Follow-Up.    Mr. Solorio is a 68 year old man with chronic diastolic heart failure, aortic stenosis status post bioprosthetic aortic valve replacement (5/14/15), nonobstructive coronary artery disease, carotid artery disease, hypertension, hyperlipidemia, diabetes, obstructive sleep apnea, and obesity; who presents for cardiovascular follow-up. I last saw him in the office on October 5, 2018at which time he reported stable cardiovascular symptoms.  He had undergone repeat echocardiogram prior to that visit that showed preserved systolic function, stable aortic valve gradients, normal IVC, and were unable to estimate RVSP.  He was subsequently seen by his nephrologist, Dr. Frankel on January 4, 2018 notes reviewed.    Today, Mr. Solorio returns for cardiovascular follow-up and reports feeling well. She continues to see Dr. Gibson, sees her next week. He describes dietary changes remain difficult, \"all mental.\" Works next to the treadmill, but does not use it. Sedentary lifestyle, works on the computer. Dietary indiscretion.     Walked a lot in [place], now hip aggravated. Headed to [place] in a few weeks, " plans to do some walking there. No blue lips with ambulation.     He denies chest pain, shortness of breath, or orthopnea. Lower extremity edema is stable, right greater than left. He is not wearing compression stockings. He denies palpitations, dizziness, lightheadedness, or syncope.      Diabetes has been acceptable but creeping, most recent hemoglobin A1c 7.0%. Just saw endocrine, who did labs which he states he will fax to me.    He reports wearing BiPAP for 4 hours per night, which is a significant improvement.    Past Medical History:  1. Aortic stenosis: s/p AVR (#23 [employer] Trifecta, 5/14/15)  2. Coronary artery disease: non-obstructive 30% prox LCx, 30% PDA  3. Hypertension  4. Hyperlipidemia  5. Carotid artery disease  6. Peripheral vascular disease: left SFA  7. Right bundle branch block  8. Obstructive sleep apnea: non-compliant with CPAP  9. CVA: Retinal stroke  10. Diabetes mellitus  11. Former tobacco abuse  12. GERD    Past Surgical History:  1. Aortic valve replacement: #23 [employer] Trifecta, 5/14/15, Dr. Quiroga    Medications (reviewed with patient and updated in EMR): aspirin 81mg daily, atorvastatin 40mg daily, torsemide 40mg twice daily, metoprolol succinate 150mg daily, nifedipine 90mg daily, potassium 20 mEq daily, amoxicillin 2g PRN dental procedures, insulin as directed, levothyroxine 75mcg daily, Nexium 40mg daily, sildenafil 25 mg as needed, vitamin B, co-Q 10, vitamin C.    Allergies: No known drug allergies.    Social History: . Two children. Works as an . Denies alcohol or illicit drug use.    Family History: Reviewed and noncontributory.    Review of Systems   Constitution: Positive for malaise/fatigue. Positive for weight gain.   HENT: Negative for nosebleeds.    Eyes: Negative for vision loss in left eye, vision loss in right eye and visual disturbance.   Cardiovascular: Positive for leg swelling. Negative for chest pain, cyanosis, dyspnea on exertion,  irregular heartbeat, near-syncope, orthopnea, palpitations, paroxysmal nocturnal dyspnea and syncope.   Respiratory: Negative for cough, shortness of breath, snoring and wheezing.    Endocrine: Negative for polyuria.   Hematologic/Lymphatic: Negative for bleeding problem. Does not bruise/bleed easily.   Skin: Positive for color change and rash.   Musculoskeletal: Positive for arthritis and back pain. Negative for myalgias.   Gastrointestinal: Negative for abdominal pain, hematochezia, melena, nausea and vomiting.   Genitourinary: Negative for hematuria.   Neurological: Negative for dizziness and light-headedness.   Psychiatric/Behavioral: Negative for altered mental status.  And depression.       Objective   Vitals:    04/11/19 1215   BP: (!) 138/58   Pulse: 76   Resp: 17   SpO2: 98%     Body mass index is 43.77 kg/m².  Physical Exam   Constitutional: He appears well-developed and well-nourished. No distress.   HENT:   Head: Normocephalic.   Mouth/Throat: Mucous membranes are normal. Mucous membranes are not cyanotic.   Eyes: Conjunctivae are normal.   Neck: No JVD present. Carotid bruit is not present.   Cardiovascular: Normal rate, regular rhythm, S1 normal and S2 normal.   No extrasystoles are present. Exam reveals no gallop.    Murmur (2/6 SAIMA, 1/6 diastolic blowing at base) heard.  Pulses:       Carotid pulses are 2+ on the right side, and 2+ on the left side.       Radial pulses are 2+ on the right side, and 2+ on the left side.   Pulmonary/Chest: Effort normal. No respiratory distress. He has no wheezes. He has no rales.   Abdominal: Soft. Bowel sounds are normal. There is no tenderness.   Musculoskeletal: He exhibits edema, right greater than left.   Lymphadenopathy:     He has no cervical adenopathy.   Neurological: He is alert.   Skin: Skin is warm and dry. Rash (Chronic stasis changes.) noted. No cyanosis.   Psychiatric: He has a normal mood and affect. His speech is normal.     Labs:  Phlebotomy 5/16/18  (per Dr. Frankel's notes): Creatinine 1.5, LDL 81, A1c 6.8%, TSH 4.5.  From 3/13/17: Hemoglobin A1c 6.9, sodium 138, potassium 4.2, BUN 41, creatinine 1.53, GFR 46, total cholesterol 144, triglycerides 130, HDL 44, LDL 74. From 11/17/16: creatinine 1.6, GFR 44. My interpretation: stable renal insufficiency, normal potassium, low HDL, adequate LDL, adequate hemoglobin A1c.    Cardiovascular Studies:  1. Echocardiogram 10/5/18: Mild concentric left ventricular hypertrophy with normal cavity size and preserved systolic function, EF 60%. Abnormal septal motion. Mildly dilated right ventricular cavity size with preserved systolic function. Status post #23 trifecta aortic valve prosthesis with peak velocity 2.5 m/s, mean gradient 16 mmHg. Mild to moderate aortic regurgitation. Mild mitral stenosis with mean gradient of 5 mmHg heart rate is 68.  Mild mitral regurgitation. Severe MAC. Unable to estimate right ventricular systolic pressure secondary to lack of TR jet.  Normal IVC with greater than 50% respiratory variation.  2. Echocardiogram 10/18/16: Mild concentric left ventricular hypertrophy with normal cavity size and preserved systolic function, EF 60%. Abnormal septal motion. Mildly dilated right ventricular cavity size with preserved systolic function. Status post #23 trifecta aortic valve prosthesis with peak velocity 2.2 m/s, mean gradient 11 mmHg. Mild aortic regurgitation. Mild mitral stenosis with mean gradient of 2 mmHg heart rate is 71. Trace mitral regurgitation. Severe MAC. Unable to estimate right ventricular systolic pressure secondary to lack of TR jet.  3. RHC 10/14/16: RA 16, RV 75/16, PA 75/27, PCWP 26, CO 5.7, CI 2.7, PA sat 58%.  4. NESHA 5/18/15 (personally reviewed): Post-AVR. Bioprosthetic AVR with peak velocity 1.98 m/s, mean gradient of 8 mmHg. No paravalvular regurgitation. Trace transvalvular regurgitation. Normal LV size and systolic function. Normal RV size and systolic function.   5.  Coronary angiogram 3/9/15 ([place]): patent LMCA, 30% prox LCx, 30% PDA.   6. Carotid CTA 9/9/2014: Mild atherosclerotic changes of the right carotid bifurcation with out evidence for focal areas of stenosis at the origin of the right ICA. Approximately 40% stenosis at the origin of the left ICA given by heavily calcified plaque at this level. Patent b/l vertebral arteries w/o focal areas of stenosis.    Assessment/Plan   Problem List Items Addressed This Visit     Pulmonary hypertension (CMS/[facility]) ([facility])     [facility] 10/14/16: RA 16, RV 75/16, PA 75/27, PCWP 26, CO 5.7, CI 2.7, PA sat 58%.  Secondary to diastolic heart failure, untreated/undertreated FREDRICK and underlying obesity hypoventilation syndrome.  --Continue diuresis.  --Encouraged weight loss.         Chronic diastolic congestive heart failure (CMS/[facility]) ([facility]) - Primary     Hospitalized October 14-24, 2016 for acute on chronic diastolic heart failure with aggressive diuresis with IV Bumex and ultimate transition to PO torsemide prior to discharge. Notably, admission weight was 244 pounds and discharge weight 225 pounds.   He is 255 pounds on exam today, stable from last visit.   As previously discussed, dyspnea and hypoxemia are likely multifactorial: secondary to chronic diastolic heart failure, untreated sleep apnea, and restrictive lung disease in the setting of obesity.   NYHA Class II-III. ACC/AHA Stage C.  --Continue torsemide 40mg TWICE daily.   --Monitor home weights and call with increase >2 pounds in 24 hours.   --Stressed fluid and dietary compliance. CHF education provided.   --Stressed at length the importance of compliance with BiPAP, which has been an ongoing struggle for him.   --Also recommend compression socks, knee length.   --CHF diet discussed at length.          Coronary artery disease involving native coronary artery of native heart without angina pectoris     Coronary angiogram 3/9/15 ([place]): patent LMCA, 30% prox LCx, 30% PDA.     Asymptomatic.   --Continue aspirin, high intensity statin, and beta blocker.           Essential hypertension     Blood pressure mildly elevated, measuring 138/58 mmHg on exam.  --Continue current regimen for now.  --Consider addition of Imdur/hydralazine if remains >130/80 mmHg.         Mixed hyperlipidemia     The patient has associated coronary and peripheral artery disease and diabetes.    He is tolerating lipid lowering therapy well.    Lipids from 3/13/17: total cholesterol 144, triglycerides 130, HDL 44, LDL 74.  --Continue high-intensity statin, atorvastatin 40 mg daily.   --Goal LDL <70.         FREDRICK (obstructive sleep apnea)     Improved compliance with BiPAP although remains suboptimal.   --Again stressed the importance of compliance with BiPAP therapy and risk of heart failure, severe hypertension, arrhythmia, stroke, and death without treatment. He reports understanding and will attempt to increase use. Follow-up with sleep medicine.   --Discussed lifestyle modification and need for weight loss. Recommend consideration of bariatric surgery as a majority of his medical issues are secondary to obesity.         Nonrheumatic aortic valve stenosis     Status post #23 [employer] Trifecta, 5/14/15 with Dr. Quiroga.   Echocardiogram from 10/5/18: Status post #23 trifecta aortic valve prosthesis with peak velocity 2.5 m/s, mean gradient 16 mmHg. Mild to moderate aortic regurgitation.   Increased degree of aortic regurgitation.  --Repeat echocardiogram in 2-3 years to monitor aortic regurgitation.  --Continue aspirin and statin.   --Patient aware of need for antibiotic prophylaxis prior to dental procedures, has a prescription for amoxicillin.         Atherosclerosis of both carotid arteries     Carotid CTA 9/9/2014 showed mild atherosclerosis of the right carotid bifurcation and 40% stenosis at the origin of the left internal carotid artery with heavily calcified plaque at this level. Carotid US 11/4/17 reports  no significant stenosis.   --Continue high intensity statin, atorvastatin 40mg daily.   --Continue aspirin 81mg.    --Consider repeat US in 2-3 years.         RBBB (right bundle branch block)     The patient has had no syncopal episodes.    No clinical bradyarrhythmias are noted.    --Continue to monitor.   --Treat obstructive sleep apnea as above.         Type 2 diabetes mellitus with circulatory disorder, with long-term current use of insulin (CMS/[facility])     Patient reports his most recent hemoglobin A1c up from 6.8% to 7.0%.  Insulin.  --He reports significant dietary indiscretion.  Lifestyle modification discussed at length.         Class 3 severe obesity due to excess calories with serious comorbidity and body mass index (BMI) of 45.0 to 49.9 in adult (CMS/[facility])     BMI 43.8.  --Encouraged weight loss.  --Needs to start walking or doing some exercise daily.  Lifestyle modification discussed at length.          Peripheral arterial disease (CMS/[facility]) ([facility])     >75% left SFA with claudication.   Stable claudication.  --He is not exercising. Stressed the importance of walking.   --Followed by Dr. Wiley.   --Continue aspirin and statin.                 Return in about 9 months (around 1/11/2020).    Crys Valencia MD, [facility]

## 2019-04-11 NOTE — ASSESSMENT & PLAN NOTE
Hospitalized October 14-24, 2016 for acute on chronic diastolic heart failure with aggressive diuresis with IV Bumex and ultimate transition to PO torsemide prior to discharge. Notably, admission weight was 244 pounds and discharge weight 225 pounds.   He is 255 pounds on exam today, stable from last visit.   As previously discussed, dyspnea and hypoxemia are likely multifactorial: secondary to chronic diastolic heart failure, untreated sleep apnea, and restrictive lung disease in the setting of obesity.   NYHA Class II-III. ACC/AHA Stage C.  --Continue torsemide 40mg TWICE daily.   --Monitor home weights and call with increase >2 pounds in 24 hours.   --Stressed fluid and dietary compliance. CHF education provided.   --Stressed at length the importance of compliance with BiPAP, which has been an ongoing struggle for him.   --Also recommend compression socks, knee length.   --CHF diet discussed at length.

## 2019-04-11 NOTE — ASSESSMENT & PLAN NOTE
Patient reports his most recent hemoglobin A1c up from 6.8% to 7.0%.  Insulin.  --He reports significant dietary indiscretion.  Lifestyle modification discussed at length.

## 2019-04-11 NOTE — ASSESSMENT & PLAN NOTE
[facility] 10/14/16: RA 16, RV 75/16, PA 75/27, PCWP 26, CO 5.7, CI 2.7, PA sat 58%.  Secondary to diastolic heart failure, untreated/undertreated FREDRICK and underlying obesity hypoventilation syndrome.  --Continue diuresis.  --Encouraged weight loss.

## 2019-04-11 NOTE — PROGRESS NOTES
" Crys Valencia MD, [facility]  Cardiology    [facility]    [facility]  [facility] [address] [facility]  [address] [place] [address]    TEL  [phone]  [facility]     April 11, 2019    Reason for visit: Cardiovascular Follow-Up.    Mr. Solorio is a 68 year old man with chronic diastolic heart failure, aortic stenosis status post bioprosthetic aortic valve replacement (5/14/15), nonobstructive coronary artery disease, carotid artery disease, hypertension, hyperlipidemia, diabetes, obstructive sleep apnea, and obesity; who presents for cardiovascular follow-up. I last saw him in the office on October 5, 2018at which time he reported stable cardiovascular symptoms.  He had undergone repeat echocardiogram prior to that visit that showed preserved systolic function, stable aortic valve gradients, normal IVC, and were unable to estimate RVSP.  He was subsequently seen by his nephrologist, Dr. Frankel on January 4, 2018 notes reviewed.    Today, Mr. Solorio returns for cardiovascular follow-up and reports feeling well. She continues to see Dr. Gibson, sees her next week. He describes dietary changes remain difficult, \"all mental.\" Works next to the treadmill, but does not use it. Sedentary lifestyle, works on the computer. Dietary indiscretion.     Walked a lot in [place], now hip aggravated. Headed to [place] in a few weeks, plans to do some walking there. No blue lips with ambulation.     He denies chest pain, shortness of breath, or orthopnea. Lower extremity edema is stable, right greater than left. He is not wearing compression stockings. He denies palpitations, dizziness, lightheadedness, or syncope.      Diabetes has been acceptable but creeping, most recent hemoglobin A1c 7.0%. Just saw endocrine, who did labs which he states he will fax to me.    He reports wearing BiPAP for 4 hours per night, which is a significant improvement.    Past Medical History:  1. " Aortic stenosis: s/p AVR (#23 [employer] Trifecta, 5/14/15)  2. Coronary artery disease: non-obstructive 30% prox LCx, 30% PDA  3. Hypertension  4. Hyperlipidemia  5. Carotid artery disease  6. Peripheral vascular disease: left SFA  7. Right bundle branch block  8. Obstructive sleep apnea: non-compliant with CPAP  9. CVA: Retinal stroke  10. Diabetes mellitus  11. Former tobacco abuse  12. GERD    Past Surgical History:  1. Aortic valve replacement: #23 [facility] Trifecta, 5/14/15, Dr. Quiroga    Medications (reviewed with patient and updated in EMR): aspirin 81mg daily, atorvastatin 40mg daily, torsemide 40mg twice daily, metoprolol succinate 150mg daily, nifedipine 90mg daily, potassium 20 mEq daily, amoxicillin 2g PRN dental procedures, insulin as directed, levothyroxine 75mcg daily, Nexium 40mg daily, sildenafil 25 mg as needed, vitamin B, co-Q 10, vitamin C.    Allergies: No known drug allergies.    Social History: . Two children. Works as an . Denies alcohol or illicit drug use.    Family History: Reviewed and noncontributory.    Review of Systems   Constitution: Positive for malaise/fatigue. Positive for weight gain.   HENT: Negative for nosebleeds.    Eyes: Negative for vision loss in left eye, vision loss in right eye and visual disturbance.   Cardiovascular: Positive for leg swelling. Negative for chest pain, cyanosis, dyspnea on exertion, irregular heartbeat, near-syncope, orthopnea, palpitations, paroxysmal nocturnal dyspnea and syncope.   Respiratory: Negative for cough, shortness of breath, snoring and wheezing.    Endocrine: Negative for polyuria.   Hematologic/Lymphatic: Negative for bleeding problem. Does not bruise/bleed easily.   Skin: Positive for color change and rash.   Musculoskeletal: Positive for arthritis and back pain. Negative for myalgias.   Gastrointestinal: Negative for abdominal pain, hematochezia, melena, nausea and vomiting.   Genitourinary: Negative for  hematuria.   Neurological: Negative for dizziness and light-headedness.   Psychiatric/Behavioral: Negative for altered mental status.  And depression.       Objective   Vitals:    04/11/19 1215   BP: (!) 138/58   Pulse: 76   Resp: 17   SpO2: 98%     Body mass index is 43.77 kg/m².  Physical Exam   Constitutional: He appears well-developed and well-nourished. No distress.   HENT:   Head: Normocephalic.   Mouth/Throat: Mucous membranes are normal. Mucous membranes are not cyanotic.   Eyes: Conjunctivae are normal.   Neck: No JVD present. Carotid bruit is not present.   Cardiovascular: Normal rate, regular rhythm, S1 normal and S2 normal.   No extrasystoles are present. Exam reveals no gallop.    Murmur (2/6 SAIMA, 1/6 diastolic blowing at base) heard.  Pulses:       Carotid pulses are 2+ on the right side, and 2+ on the left side.       Radial pulses are 2+ on the right side, and 2+ on the left side.   Pulmonary/Chest: Effort normal. No respiratory distress. He has no wheezes. He has no rales.   Abdominal: Soft. Bowel sounds are normal. There is no tenderness.   Musculoskeletal: He exhibits edema, right greater than left.   Lymphadenopathy:     He has no cervical adenopathy.   Neurological: He is alert.   Skin: Skin is warm and dry. Rash (Chronic stasis changes.) noted. No cyanosis.   Psychiatric: He has a normal mood and affect. His speech is normal.     Labs:  Phlebotomy 5/16/18 (per Dr. Frankel's notes): Creatinine 1.5, LDL 81, A1c 6.8%, TSH 4.5.  From 3/13/17: Hemoglobin A1c 6.9, sodium 138, potassium 4.2, BUN 41, creatinine 1.53, GFR 46, total cholesterol 144, triglycerides 130, HDL 44, LDL 74. From 11/17/16: creatinine 1.6, GFR 44. My interpretation: stable renal insufficiency, normal potassium, low HDL, adequate LDL, adequate hemoglobin A1c.    Cardiovascular Studies:  1. Echocardiogram 10/5/18: Mild concentric left ventricular hypertrophy with normal cavity size and preserved systolic function, EF 60%. Abnormal  septal motion. Mildly dilated right ventricular cavity size with preserved systolic function. Status post #23 trifecta aortic valve prosthesis with peak velocity 2.5 m/s, mean gradient 16 mmHg. Mild to moderate aortic regurgitation. Mild mitral stenosis with mean gradient of 5 mmHg heart rate is 68.  Mild mitral regurgitation. Severe MAC. Unable to estimate right ventricular systolic pressure secondary to lack of TR jet.  Normal IVC with greater than 50% respiratory variation.  2. Echocardiogram 10/18/16: Mild concentric left ventricular hypertrophy with normal cavity size and preserved systolic function, EF 60%. Abnormal septal motion. Mildly dilated right ventricular cavity size with preserved systolic function. Status post #23 trifecta aortic valve prosthesis with peak velocity 2.2 m/s, mean gradient 11 mmHg. Mild aortic regurgitation. Mild mitral stenosis with mean gradient of 2 mmHg heart rate is 71. Trace mitral regurgitation. Severe MAC. Unable to estimate right ventricular systolic pressure secondary to lack of TR jet.  3. RHC 10/14/16: RA 16, RV 75/16, PA 75/27, PCWP 26, CO 5.7, CI 2.7, PA sat 58%.  4. NESHA 5/18/15 (personally reviewed): Post-AVR. Bioprosthetic AVR with peak velocity 1.98 m/s, mean gradient of 8 mmHg. No paravalvular regurgitation. Trace transvalvular regurgitation. Normal LV size and systolic function. Normal RV size and systolic function.   5. Coronary angiogram 3/9/15 ([place]): patent LMCA, 30% prox LCx, 30% PDA.   6. Carotid CTA 9/9/2014: Mild atherosclerotic changes of the right carotid bifurcation with out evidence for focal areas of stenosis at the origin of the right ICA. Approximately 40% stenosis at the origin of the left ICA given by heavily calcified plaque at this level. Patent b/l vertebral arteries w/o focal areas of stenosis.    Assessment/Plan   Problem List Items Addressed This Visit     Pulmonary hypertension (CMS/HCC) ([facility])     RHC 10/14/16: RA 16, RV 75/16, PA 75/27,  PCWP 26, CO 5.7, CI 2.7, PA sat 58%.  Secondary to diastolic heart failure, untreated/undertreated FREDRICK and underlying obesity hypoventilation syndrome.  --Continue diuresis.  --Encouraged weight loss.         Chronic diastolic congestive heart failure (CMS/HCC) (HCC) - Primary     Hospitalized October 14-24, 2016 for acute on chronic diastolic heart failure with aggressive diuresis with IV Bumex and ultimate transition to PO torsemide prior to discharge. Notably, admission weight was 244 pounds and discharge weight 225 pounds.   He is 255 pounds on exam today, stable from last visit.   As previously discussed, dyspnea and hypoxemia are likely multifactorial: secondary to chronic diastolic heart failure, untreated sleep apnea, and restrictive lung disease in the setting of obesity.   NYHA Class II-III. ACC/AHA Stage C.  --Continue torsemide 40mg TWICE daily.   --Monitor home weights and call with increase >2 pounds in 24 hours.   --Stressed fluid and dietary compliance. CHF education provided.   --Stressed at length the importance of compliance with BiPAP, which has been an ongoing struggle for him.   --Also recommend compression socks, knee length.   --CHF diet discussed at length.          Coronary artery disease involving native coronary artery of native heart without angina pectoris     Coronary angiogram 3/9/15 ([place]): patent LMCA, 30% prox LCx, 30% PDA.    Asymptomatic.   --Continue aspirin, high intensity statin, and beta blocker.           Essential hypertension     Blood pressure mildly elevated, measuring 138/58 mmHg on exam.  --Continue current regimen for now.  --Consider addition of Imdur/hydralazine if remains >130/80 mmHg.         Mixed hyperlipidemia     The patient has associated coronary and peripheral artery disease and diabetes.    He is tolerating lipid lowering therapy well.    Lipids from 3/13/17: total cholesterol 144, triglycerides 130, HDL 44, LDL 74.  --Continue high-intensity statin,  atorvastatin 40 mg daily.   --Goal LDL <70.         FREDRICK (obstructive sleep apnea)     Improved compliance with BiPAP although remains suboptimal.   --Again stressed the importance of compliance with BiPAP therapy and risk of heart failure, severe hypertension, arrhythmia, stroke, and death without treatment. He reports understanding and will attempt to increase use. Follow-up with sleep medicine.   --Discussed lifestyle modification and need for weight loss. Recommend consideration of bariatric surgery as a majority of his medical issues are secondary to obesity.         Nonrheumatic aortic valve stenosis     Status post #23 [facility] Trifecta, 5/14/15 with Dr. Quiroga.   Echocardiogram from 10/5/18: Status post #23 trifecta aortic valve prosthesis with peak velocity 2.5 m/s, mean gradient 16 mmHg. Mild to moderate aortic regurgitation.   Increased degree of aortic regurgitation.  --Repeat echocardiogram in 2-3 years to monitor aortic regurgitation.  --Continue aspirin and statin.   --Patient aware of need for antibiotic prophylaxis prior to dental procedures, has a prescription for amoxicillin.         Atherosclerosis of both carotid arteries     Carotid CTA 9/9/2014 showed mild atherosclerosis of the right carotid bifurcation and 40% stenosis at the origin of the left internal carotid artery with heavily calcified plaque at this level. Carotid US 11/4/17 reports no significant stenosis.   --Continue high intensity statin, atorvastatin 40mg daily.   --Continue aspirin 81mg.    --Consider repeat US in 2-3 years.         RBBB (right bundle branch block)     The patient has had no syncopal episodes.    No clinical bradyarrhythmias are noted.    --Continue to monitor.   --Treat obstructive sleep apnea as above.         Type 2 diabetes mellitus with circulatory disorder, with long-term current use of insulin (CMS/[facility])     Patient reports his most recent hemoglobin A1c up from 6.8% to 7.0%.  Insulin.  --He reports  significant dietary indiscretion.  Lifestyle modification discussed at length.         Class 3 severe obesity due to excess calories with serious comorbidity and body mass index (BMI) of 45.0 to 49.9 in adult (CMS/[facility])     BMI 43.8.  --Encouraged weight loss.  --Needs to start walking or doing some exercise daily.  Lifestyle modification discussed at length.          Peripheral arterial disease (CMS/[facility]) ([facility])     >75% left SFA with claudication.   Stable claudication.  --He is not exercising. Stressed the importance of walking.   --Followed by Dr. Wiley.   --Continue aspirin and statin.                 Return in about 9 months (around 1/11/2020).    Crys Valencia MD, [facility]

## 2019-04-11 NOTE — ASSESSMENT & PLAN NOTE
Status post #23 [facility] Trifecta, 5/14/15 with Dr. Quiroga.   Echocardiogram from 10/5/18: Status post #23 trifecta aortic valve prosthesis with peak velocity 2.5 m/s, mean gradient 16 mmHg. Mild to moderate aortic regurgitation.   Increased degree of aortic regurgitation.  --Repeat echocardiogram in 2-3 years to monitor aortic regurgitation.  --Continue aspirin and statin.   --Patient aware of need for antibiotic prophylaxis prior to dental procedures, has a prescription for amoxicillin.

## 2019-07-09 RX ORDER — TORSEMIDE 20 MG/1
40 TABLET ORAL 2 TIMES DAILY
Qty: 180 TABLET | Refills: 3 | Status: SHIPPED | OUTPATIENT
Start: 2019-07-09 | End: 2020-01-16 | Stop reason: SDUPTHER

## 2019-07-09 NOTE — TELEPHONE ENCOUNTER
Medicine Refill Request    Last Office Visit: Visit date not found  Next Office Visit: Visit date not found        Current Outpatient Prescriptions:   •  alclometasone (ACLOVATE) 0.05 % cream, APPLY TO ITCHY RASH ON SCROTUM TWICE DAILY AS DIRECTED, Disp: , Rfl: 1  •  amoxicillin (AMOXIL) 500 mg capsule, TAKE 4 CAPSULES BY MOUTH 1 HOUR PRIOR TO APPT, Disp: , Rfl: 0  •  aspirin 325 mg tablet, 325 mg., Disp: , Rfl:   •  atorvastatin (LIPITOR) 40 mg tablet, Take 1 tablet (40 mg total) by mouth daily., Disp: 90 tablet, Rfl: 3  •  BASAGLAR KWIKPEN 100 unit/mL (3 mL) subcutaneous pen, INJECT 60 UNITS SUBCUTANEOUSLY DAILY, Disp: , Rfl: 3  •  betamethasone dipropionate (DIPROSONE) 0.05 % lotion, APPLY TO RASH ON SCALP TWICE DAILY AS DIRECTED (NOT TO USE ON FACE, BREASTS, ARMPITS AND GROIN), Disp: , Rfl: 1  •  calcipotriene-betamethasone (TACLONEX) ointment, As directed, Disp: , Rfl:   •  CONTOUR NEXT STRIPS strip, TAKE 1 STRIP THREE TIMES A DAY, Disp: , Rfl: 3  •  esomeprazole (NexIUM) 40 mg capsule, Take 40 mg by mouth once daily., Disp: , Rfl: 3  •  HUMALOG KWIKPEN 100 unit/mL subcutaneous pen, TAKE 15 UNITS SUBCUTANEOUSLY THREE TIMES A DAY, Disp: , Rfl: 3  •  insulin glargine (BASAGLAR KWIKPEN) 100 unit/mL (3 mL) subcutaneous pen, No SIG Entered, Disp: , Rfl:   •  insulin lispro protamin-lispro (HumaLOG Mix 75-25) 100 unit/mL (75-25) suspension, inject by subcutaneous route per prescriber's instructions. Insulin dosing requires individualization., Disp: , Rfl:   •  KLOR-CON M20 20 mEq CR tablet, Take 20 mEq by mouth 2 (two) times a day.  , Disp: , Rfl: 3  •  latanoprost (XALATAN) 0.005 % ophthalmic solution, 0.005 %., Disp: , Rfl:   •  levothyroxine (SYNTHROID) 75 mcg tablet, Take 75 mcg by mouth once daily., Disp: , Rfl: 4  •  metoprolol succinate XL (TOPROL-XL) 100 mg 24 hr tablet, Take 1.5 tablets (150 mg total) by mouth daily., Disp: 180 tablet, Rfl: 3  •  NIFEdipine XL (PROCARDIA XL) 90 mg 24 hr tablet, One tablet  daily, Disp: 90 tablet, Rfl: 3  •  tamsulosin (FLOMAX) 0.4 mg capsule, 0.4 mg., Disp: , Rfl:   •  torsemide (DEMADEX) 20 mg tablet, Take 2 tablets (40 mg total) by mouth 2 (two) times a day., Disp: 180 tablet, Rfl: 3      BP Readings from Last 3 Encounters:   04/11/19 (!) 138/58   10/05/18 (!) 180/50   10/05/18 134/68       Recent Lab results:  Lab Results   Component Value Date    CHOL 121 04/22/2015   ,   Lab Results   Component Value Date    HDL 43 (L) 04/22/2015   ,   Lab Results   Component Value Date    LDLCALC 64 04/22/2015   ,   Lab Results   Component Value Date    TRIG 68 04/22/2015        Lab Results   Component Value Date    GLUCOSE 216 (H) 10/24/2016   ,   Lab Results   Component Value Date    HGBA1C 7.3 (H) 10/15/2016         Lab Results   Component Value Date    CREATININE 2.0 (H) 10/24/2016       No results found for: TSH

## 2019-08-26 ENCOUNTER — FOLLOW UP (OUTPATIENT)
Dept: URBAN - METROPOLITAN AREA CLINIC 35 | Facility: CLINIC | Age: 69
End: 2019-08-26

## 2019-08-26 DIAGNOSIS — E11.9: ICD-10-CM

## 2019-08-26 DIAGNOSIS — H25.9: ICD-10-CM

## 2019-08-26 DIAGNOSIS — H43.393: ICD-10-CM

## 2019-08-26 DIAGNOSIS — H40.9: ICD-10-CM

## 2019-08-26 DIAGNOSIS — H34.11: ICD-10-CM

## 2019-08-26 PROCEDURE — 92134 CPTRZ OPH DX IMG PST SGM RTA: CPT

## 2019-08-26 PROCEDURE — 92014 COMPRE OPH EXAM EST PT 1/>: CPT

## 2019-08-26 ASSESSMENT — VISUAL ACUITY: OS_CC: 20/25+2

## 2019-08-26 ASSESSMENT — TONOMETRY
OS_IOP_MMHG: 15
OD_IOP_MMHG: 15

## 2019-09-09 RX ORDER — ATORVASTATIN CALCIUM 40 MG/1
40 TABLET, FILM COATED ORAL DAILY
Qty: 90 TABLET | Refills: 1 | Status: SHIPPED | OUTPATIENT
Start: 2019-09-09 | End: 2019-10-04 | Stop reason: SDUPTHER

## 2019-10-04 NOTE — TELEPHONE ENCOUNTER
Medicine Refill Request    Last Office Visit: Visit date not found  Next Office Visit: Visit date not found        Current Outpatient Prescriptions:   •  alclometasone (ACLOVATE) 0.05 % cream, APPLY TO ITCHY RASH ON SCROTUM TWICE DAILY AS DIRECTED, Disp: , Rfl: 1  •  amoxicillin (AMOXIL) 500 mg capsule, TAKE 4 CAPSULES BY MOUTH 1 HOUR PRIOR TO APPT, Disp: , Rfl: 0  •  aspirin 325 mg tablet, 325 mg., Disp: , Rfl:   •  atorvastatin (LIPITOR) 40 mg tablet, Take 1 tablet (40 mg total) by mouth daily., Disp: 90 tablet, Rfl: 1  •  BASAGLAR KWIKPEN 100 unit/mL (3 mL) subcutaneous pen, INJECT 60 UNITS SUBCUTANEOUSLY DAILY, Disp: , Rfl: 3  •  betamethasone dipropionate (DIPROSONE) 0.05 % lotion, APPLY TO RASH ON SCALP TWICE DAILY AS DIRECTED (NOT TO USE ON FACE, BREASTS, ARMPITS AND GROIN), Disp: , Rfl: 1  •  calcipotriene-betamethasone (TACLONEX) ointment, As directed, Disp: , Rfl:   •  CONTOUR NEXT STRIPS strip, TAKE 1 STRIP THREE TIMES A DAY, Disp: , Rfl: 3  •  esomeprazole (NexIUM) 40 mg capsule, Take 40 mg by mouth once daily., Disp: , Rfl: 3  •  HUMALOG KWIKPEN 100 unit/mL subcutaneous pen, TAKE 15 UNITS SUBCUTANEOUSLY THREE TIMES A DAY, Disp: , Rfl: 3  •  insulin glargine (BASAGLAR KWIKPEN) 100 unit/mL (3 mL) subcutaneous pen, No SIG Entered, Disp: , Rfl:   •  insulin lispro protamin-lispro (HumaLOG Mix 75-25) 100 unit/mL (75-25) suspension, inject by subcutaneous route per prescriber's instructions. Insulin dosing requires individualization., Disp: , Rfl:   •  KLOR-CON M20 20 mEq CR tablet, Take 20 mEq by mouth 2 (two) times a day.  , Disp: , Rfl: 3  •  latanoprost (XALATAN) 0.005 % ophthalmic solution, 0.005 %., Disp: , Rfl:   •  levothyroxine (SYNTHROID) 75 mcg tablet, Take 75 mcg by mouth once daily., Disp: , Rfl: 4  •  metoprolol succinate XL (TOPROL-XL) 100 mg 24 hr tablet, Take 1.5 tablets (150 mg total) by mouth daily., Disp: 180 tablet, Rfl: 3  •  NIFEdipine XL (PROCARDIA XL) 90 mg 24 hr tablet, One tablet  daily, Disp: 90 tablet, Rfl: 3  •  tamsulosin (FLOMAX) 0.4 mg capsule, 0.4 mg., Disp: , Rfl:   •  torsemide (DEMADEX) 20 mg tablet, Take 2 tablets (40 mg total) by mouth 2 (two) times a day., Disp: 180 tablet, Rfl: 3      BP Readings from Last 3 Encounters:   04/11/19 (!) 138/58   10/05/18 (!) 180/50   10/05/18 134/68       Recent Lab results:  Lab Results   Component Value Date    CHOL 121 04/22/2015   ,   Lab Results   Component Value Date    HDL 43 (L) 04/22/2015   ,   Lab Results   Component Value Date    LDLCALC 64 04/22/2015   ,   Lab Results   Component Value Date    TRIG 68 04/22/2015        Lab Results   Component Value Date    GLUCOSE 216 (H) 10/24/2016   ,   Lab Results   Component Value Date    HGBA1C 7.3 (H) 10/15/2016         Lab Results   Component Value Date    CREATININE 2.0 (H) 10/24/2016       No results found for: TSH

## 2019-10-07 RX ORDER — ATORVASTATIN CALCIUM 40 MG/1
40 TABLET, FILM COATED ORAL DAILY
Qty: 90 TABLET | Refills: 1 | Status: SHIPPED | OUTPATIENT
Start: 2019-10-07

## 2020-01-16 ENCOUNTER — OFFICE VISIT (OUTPATIENT)
Dept: CARDIOLOGY | Facility: CLINIC | Age: 70
End: 2020-01-16
Payer: COMMERCIAL

## 2020-01-16 VITALS
DIASTOLIC BLOOD PRESSURE: 52 MMHG | WEIGHT: 242 LBS | HEART RATE: 66 BPM | HEIGHT: 64 IN | BODY MASS INDEX: 41.32 KG/M2 | SYSTOLIC BLOOD PRESSURE: 128 MMHG | OXYGEN SATURATION: 95 %

## 2020-01-16 DIAGNOSIS — I65.23 ATHEROSCLEROSIS OF BOTH CAROTID ARTERIES: ICD-10-CM

## 2020-01-16 DIAGNOSIS — I35.0 NONRHEUMATIC AORTIC VALVE STENOSIS: ICD-10-CM

## 2020-01-16 DIAGNOSIS — I10 ESSENTIAL HYPERTENSION: ICD-10-CM

## 2020-01-16 DIAGNOSIS — I27.20 PULMONARY HYPERTENSION (CMS/HCC): ICD-10-CM

## 2020-01-16 DIAGNOSIS — G47.33 OSA (OBSTRUCTIVE SLEEP APNEA): ICD-10-CM

## 2020-01-16 DIAGNOSIS — E66.813 CLASS 3 SEVERE OBESITY DUE TO EXCESS CALORIES WITH SERIOUS COMORBIDITY AND BODY MASS INDEX (BMI) OF 45.0 TO 49.9 IN ADULT (CMS/HCC): ICD-10-CM

## 2020-01-16 DIAGNOSIS — E78.2 MIXED HYPERLIPIDEMIA: ICD-10-CM

## 2020-01-16 DIAGNOSIS — E66.01 CLASS 3 SEVERE OBESITY DUE TO EXCESS CALORIES WITH SERIOUS COMORBIDITY AND BODY MASS INDEX (BMI) OF 45.0 TO 49.9 IN ADULT (CMS/HCC): ICD-10-CM

## 2020-01-16 DIAGNOSIS — I45.10 RBBB (RIGHT BUNDLE BRANCH BLOCK): ICD-10-CM

## 2020-01-16 DIAGNOSIS — I25.10 CORONARY ARTERY DISEASE INVOLVING NATIVE CORONARY ARTERY OF NATIVE HEART WITHOUT ANGINA PECTORIS: ICD-10-CM

## 2020-01-16 DIAGNOSIS — E11.59 TYPE 2 DIABETES MELLITUS WITH OTHER CIRCULATORY COMPLICATION, WITH LONG-TERM CURRENT USE OF INSULIN: ICD-10-CM

## 2020-01-16 DIAGNOSIS — Z79.4 TYPE 2 DIABETES MELLITUS WITH OTHER CIRCULATORY COMPLICATION, WITH LONG-TERM CURRENT USE OF INSULIN: ICD-10-CM

## 2020-01-16 DIAGNOSIS — I50.32 CHRONIC DIASTOLIC CONGESTIVE HEART FAILURE (CMS/HCC): Primary | ICD-10-CM

## 2020-01-16 DIAGNOSIS — I73.9 PERIPHERAL ARTERIAL DISEASE (CMS/HCC): ICD-10-CM

## 2020-01-16 PROCEDURE — 99214 OFFICE O/P EST MOD 30 MIN: CPT | Performed by: INTERNAL MEDICINE

## 2020-01-16 RX ORDER — TORSEMIDE 20 MG/1
40 TABLET ORAL 2 TIMES DAILY
Qty: 180 TABLET | Refills: 3 | Status: SHIPPED | OUTPATIENT
Start: 2020-01-16

## 2020-01-16 NOTE — LETTER
January 19, 2020                                                                                                                                                                                                                                                                                                                   Patient: Jimmie Solorio   YOB: 1950   Date of Visit: 1/16/2020       Dear Dr. Solorio:    Thank you for the opportunity of seeing your patient, Jimmie Solorio, today, 1/16/2020. Following is a summary of today's visit and my recommendation(s).    Thank you for allowing us to participate in Jimmie ASTUDILLO's care.  Please feel free to contact me with any questions.      Assessment / Plan:  Problem List Items Addressed This Visit        Respiratory    Pulmonary hypertension (CMS/[facility])     [facility] 10/14/16: RA 16, RV 75/16, PA 75/27, PCWP 26, CO 5.7, CI 2.7, PA sat 58%.  Secondary to diastolic heart failure, untreated/undertreated FREDRICK and underlying obesity hypoventilation syndrome.  --Continue diuresis.  --Encouraged weight loss.         FREDRICK (obstructive sleep apnea)     Improved but inconsistent compliance with BiPAP.   --Again stressed the importance of compliance with BiPAP therapy and risk of heart failure, severe hypertension, arrhythmia, stroke, and death without treatment. He reports understanding and will attempt to increase use. Follow-up with sleep medicine.   --Discussed lifestyle modification and need for weight loss. Recommend consideration of bariatric surgery as a majority of his medical issues are secondary to obesity.            Circulatory    Chronic diastolic congestive heart failure (CMS/[facility]) - Primary     Hospitalized October 14-24, 2016 for acute on chronic diastolic heart failure with aggressive diuresis with IV Bumex and ultimate transition to PO torsemide prior to discharge.   Weight stable.  Dyspnea and hypoxemia are likely multifactorial: secondary to chronic  diastolic heart failure, untreated sleep apnea, and restrictive lung disease in the setting of obesity.   NYHA Class II-III. ACC/AHA Stage C.  --Continue torsemide 40mg TWICE daily.   --Monitor home weights and call with increase >2 pounds in 24 hours.   --Stressed fluid and dietary compliance. CHF education provided.   --Stressed at length the importance of compliance with BiPAP, which has been an ongoing struggle for him.   --Also recommend compression socks, knee length.   --CHF diet discussed at length.          Coronary artery disease involving native coronary artery of native heart without angina pectoris     Coronary angiogram 3/9/15 ([place]): patent LMCA, 30% prox LCx, 30% PDA.    Asymptomatic.   --Continue aspirin, high intensity statin, and beta blocker.           Essential hypertension     Blood pressure acceptable, measuring 128/52 mmHg on exam.  --Continue current regimen.         Nonrheumatic aortic valve stenosis     Status post #23 [employer] Trifecta, 5/14/15 with Dr. Quiroga.   Echocardiogram from 10/5/18: Status post #23 trifecta aortic valve prosthesis with peak velocity 2.5 m/s, mean gradient 16 mmHg. Mild to moderate aortic regurgitation.   Increased degree of aortic regurgitation.  --Plan to repeat echocardiogram after next visit to monitor aortic regurgitation.  --Continue aspirin and statin.   --Patient aware of need for antibiotic prophylaxis prior to dental procedures, has a prescription for amoxicillin.         Atherosclerosis of both carotid arteries     Carotid CTA 9/9/2014 showed mild atherosclerosis of the right carotid bifurcation and 40% stenosis at the origin of the left internal carotid artery with heavily calcified plaque at this level. Carotid US 11/4/17 reports no significant stenosis.   --Continue high intensity statin, atorvastatin 40mg daily.   --Continue aspirin 81mg.    --Consider repeat US November 2020.         RBBB (right bundle branch block)     The patient has had  no syncopal episodes.    No clinical bradyarrhythmias are noted.    --Continue to monitor.   --Treat obstructive sleep apnea as above.         Type 2 diabetes mellitus with circulatory disorder, with long-term current use of insulin (CMS/[facility])     Patient reports his most recent hemoglobin A1c elevated.  Treated with insulin.  --He reports significant dietary indiscretion.  Lifestyle modification discussed at length.         Peripheral arterial disease (CMS/[facility])     >75% left SFA with claudication.   Stable claudication.  --He is not exercising. Stressed the importance of walking.   --Followed by Dr. Wiley.   --Continue aspirin and statin.             Endocrine/Metabolic    Mixed hyperlipidemia     The patient has associated coronary and peripheral artery disease and diabetes.    He is tolerating lipid lowering therapy well.    Lipids from 3/13/17: total cholesterol 144, triglycerides 130, HDL 44, LDL 74.  --Continue high-intensity statin, atorvastatin 40 mg daily.   --Goal LDL less than 70.         Class 3 severe obesity due to excess calories with serious comorbidity and body mass index (BMI) of 45.0 to 49.9 in adult (CMS/[facility])     BMI 41.54.  --Encouraged weight loss.  --Needs to start walking or doing some exercise daily.  Lifestyle modification discussed at length.                       Sincerely,      Crys Valencia MD    CC: Christopher J Frankel, MD

## 2020-01-16 NOTE — PROGRESS NOTES
Crys Valencia MD, [facility]  Cardiology    [facility]    [facility]  [facility] [address]  [address] [place] [address]    TEL  [phone]  [facility]     January 16, 2020    Reason for visit: Cardiovascular Follow-Up.    Mr. Solorio is a 68 year old man with chronic diastolic heart failure, aortic stenosis status post bioprosthetic aortic valve replacement (5/14/15), nonobstructive coronary artery disease, carotid and peripheral arterial disease, hypertension, hyperlipidemia, diabetes, obstructive sleep apnea, and obesity; who presents for cardiovascular follow-up. I last saw him in the office on April 11, 2019 at which time he was feeling stable.    Today, Mr. Solorio returns for cardiovascular follow-up and reports feeling well. He continues to struggle with weight. He notes feeling poorly after eating Chinese food on [place] day. Symptoms resolved the next day, but he is aware that he needs to improve food choices. Breathing is at baseline. Oxygen saturation has been 92%.    He denies chest pain, shortness of breath, or orthopnea. Lower extremity edema is stable, right greater than left. He is not wearing compression stockings. He notes a few lightheaded episodes, generally brief lasting a few seconds.  He denies palpitations, or syncope.      He states sugars have been creeping, most recent hemoglobin A1c 8.5%. He states he often forgets to take his insulin at night.   He will fax labs to me.  He reports wearing BiPAP for 4 hours per night, often falling asleep without it.    He continues to follow with Dr. Gibson. He lost 13 pounds, although gained back some over the holidays.   He is not exercising. He had a sore on his foot with severe pain, now healed. He is not having significant claudication, although is quite sedentary.     Past Medical History:  1. Aortic stenosis: s/p AVR (#23 [employer] Trifecta, 5/14/15)  2. Coronary artery disease:  non-obstructive 30% prox LCx, 30% PDA  3. Hypertension  4. Hyperlipidemia  5. Carotid artery disease  6. Peripheral vascular disease: left SFA  7. Right bundle branch block  8. Obstructive sleep apnea: non-compliant with CPAP  9. CVA: Retinal stroke  10. Diabetes mellitus  11. Former tobacco abuse  12. GERD    Past Surgical History:  1. Aortic valve replacement: #23 [employer] Trifecta, 5/14/15, Dr. Quiroga    Medications (reviewed with patient and updated in EMR): aspirin 81mg daily, atorvastatin 40mg daily, torsemide 40mg twice daily, metoprolol succinate 150mg daily, nifedipine 90mg daily, potassium 20 mEq daily, amoxicillin 2g PRN dental procedures, insulin as directed, levothyroxine 75mcg daily, Nexium 40mg daily, sildenafil 25 mg as needed, vitamin B, co-Q 10, vitamin C.    Allergies: No known drug allergies.    Social History: . Two children. Works as an . Denies alcohol or illicit drug use.    Family History: Reviewed and noncontributory.    Review of Systems   Constitution: Positive for malaise/fatigue. Positive for weight gain.   HENT: Negative for nosebleeds.    Eyes: Negative for vision loss in left eye, vision loss in right eye and visual disturbance.   Cardiovascular: Positive for leg swelling. Negative for chest pain, cyanosis, dyspnea on exertion, irregular heartbeat, near-syncope, orthopnea, palpitations, paroxysmal nocturnal dyspnea and syncope.   Respiratory: Negative for cough, shortness of breath, snoring and wheezing.    Endocrine: Negative for polyuria.   Hematologic/Lymphatic: Negative for bleeding problem. Does not bruise/bleed easily.   Skin: Positive for color change and rash.   Musculoskeletal: Positive for arthritis and back pain. Negative for myalgias.   Gastrointestinal: Negative for abdominal pain, hematochezia, melena, nausea and vomiting.   Genitourinary: Negative for hematuria.   Neurological: Negative for dizziness and light-headedness.    Psychiatric/Behavioral: Negative for altered mental status.  And depression.       Objective   Vitals:    01/16/20 1159   BP: (!) 128/52   Pulse: 66   SpO2: 95%     Body mass index is 41.54 kg/m².  Physical Exam   Constitutional: He appears well-developed and well-nourished. No distress.   HENT:   Head: Normocephalic.   Mouth/Throat: Mucous membranes are normal. Mucous membranes are not cyanotic.   Eyes: Conjunctivae are normal.   Neck: No JVD present. Carotid bruit is not present.   Cardiovascular: Normal rate, regular rhythm, S1 normal and S2 normal.   No extrasystoles are present. Exam reveals no gallop.    Murmur (2/6 SAIMA, 1/6 diastolic blowing at base) heard.  Pulses:       Carotid pulses are 2+ on the right side, and 2+ on the left side.       Radial pulses are 2+ on the right side, and 2+ on the left side.   Pulmonary/Chest: Effort normal. No respiratory distress. He has no wheezes. He has no rales.   Abdominal: Soft. Bowel sounds are normal. There is no tenderness.   Musculoskeletal: He exhibits edema, right greater than left.   Lymphadenopathy:     He has no cervical adenopathy.   Neurological: He is alert.   Skin: Skin is warm and dry. Rash (Chronic stasis changes.) noted. No cyanosis.   Psychiatric: He has a normal mood and affect. His speech is normal.     Labs:  Phlebotomy 7/20/2019 (per Dr. Frankel's notes): Sodium 142, potassium 4.1, BUN 31, creatinine 1.68.  From 3/13/17: Hemoglobin A1c 6.9, sodium 138, potassium 4.2, BUN 41, creatinine 1.53, GFR 46, total cholesterol 144, triglycerides 130, HDL 44, LDL 74. From 11/17/16: creatinine 1.6, GFR 44. My interpretation: stable renal insufficiency, normal potassium, low HDL, adequate LDL, adequate hemoglobin A1c.    Cardiovascular Studies:  1. Echocardiogram 10/5/18: Mild concentric left ventricular hypertrophy with normal cavity size and preserved systolic function, EF 60%. Abnormal septal motion. Mildly dilated right ventricular cavity size with  preserved systolic function. Status post #23 trifecta aortic valve prosthesis with peak velocity 2.5 m/s, mean gradient 16 mmHg. Mild to moderate aortic regurgitation. Mild mitral stenosis with mean gradient of 5 mmHg heart rate is 68.  Mild mitral regurgitation. Severe MAC. Unable to estimate right ventricular systolic pressure secondary to lack of TR jet.  Normal IVC with greater than 50% respiratory variation.  2. Echocardiogram 10/18/16: Mild concentric left ventricular hypertrophy with normal cavity size and preserved systolic function, EF 60%. Abnormal septal motion. Mildly dilated right ventricular cavity size with preserved systolic function. Status post #23 trifecta aortic valve prosthesis with peak velocity 2.2 m/s, mean gradient 11 mmHg. Mild aortic regurgitation. Mild mitral stenosis with mean gradient of 2 mmHg heart rate is 71. Trace mitral regurgitation. Severe MAC. Unable to estimate right ventricular systolic pressure secondary to lack of TR jet.  3. [facility] 10/14/16: RA 16, RV 75/16, PA 75/27, PCWP 26, CO 5.7, CI 2.7, PA sat 58%.  4. NESHA 5/18/15 (personally reviewed): Post-AVR. Bioprosthetic AVR with peak velocity 1.98 m/s, mean gradient of 8 mmHg. No paravalvular regurgitation. Trace transvalvular regurgitation. Normal LV size and systolic function. Normal RV size and systolic function.   5. Coronary angiogram 3/9/15 ([place]): patent LMCA, 30% prox LCx, 30% PDA.   6. Carotid CTA 9/9/2014: Mild atherosclerotic changes of the right carotid bifurcation with out evidence for focal areas of stenosis at the origin of the right ICA. Approximately 40% stenosis at the origin of the left ICA given by heavily calcified plaque at this level. Patent b/l vertebral arteries w/o focal areas of stenosis.    Assessment/Plan   Problem List Items Addressed This Visit        Respiratory    Pulmonary hypertension (CMS/HCC)     [facility] 10/14/16: RA 16, RV 75/16, PA 75/27, PCWP 26, CO 5.7, CI 2.7, PA sat 58%.  Secondary to  diastolic heart failure, untreated/undertreated FREDRICK and underlying obesity hypoventilation syndrome.  --Continue diuresis.  --Encouraged weight loss.         FREDRICK (obstructive sleep apnea)     Improved but inconsistent compliance with BiPAP.   --Again stressed the importance of compliance with BiPAP therapy and risk of heart failure, severe hypertension, arrhythmia, stroke, and death without treatment. He reports understanding and will attempt to increase use. Follow-up with sleep medicine.   --Discussed lifestyle modification and need for weight loss. Recommend consideration of bariatric surgery as a majority of his medical issues are secondary to obesity.            Circulatory    Chronic diastolic congestive heart failure (CMS/HCC) - Primary     Hospitalized October 14-24, 2016 for acute on chronic diastolic heart failure with aggressive diuresis with IV Bumex and ultimate transition to PO torsemide prior to discharge.   Weight stable.  Dyspnea and hypoxemia are likely multifactorial: secondary to chronic diastolic heart failure, untreated sleep apnea, and restrictive lung disease in the setting of obesity.   NYHA Class II-III. ACC/AHA Stage C.  --Continue torsemide 40mg TWICE daily.   --Monitor home weights and call with increase >2 pounds in 24 hours.   --Stressed fluid and dietary compliance. CHF education provided.   --Stressed at length the importance of compliance with BiPAP, which has been an ongoing struggle for him.   --Also recommend compression socks, knee length.   --CHF diet discussed at length.          Coronary artery disease involving native coronary artery of native heart without angina pectoris     Coronary angiogram 3/9/15 ([place]): patent LMCA, 30% prox LCx, 30% PDA.    Asymptomatic.   --Continue aspirin, high intensity statin, and beta blocker.           Essential hypertension     Blood pressure acceptable, measuring 128/52 mmHg on exam.  --Continue current regimen.         Nonrheumatic  aortic valve stenosis     Status post #23 [employer] Trifecta, 5/14/15 with Dr. Quiroga.   Echocardiogram from 10/5/18: Status post #23 trifecta aortic valve prosthesis with peak velocity 2.5 m/s, mean gradient 16 mmHg. Mild to moderate aortic regurgitation.   Increased degree of aortic regurgitation.  --Plan to repeat echocardiogram after next visit to monitor aortic regurgitation.  --Continue aspirin and statin.   --Patient aware of need for antibiotic prophylaxis prior to dental procedures, has a prescription for amoxicillin.         Atherosclerosis of both carotid arteries     Carotid CTA 9/9/2014 showed mild atherosclerosis of the right carotid bifurcation and 40% stenosis at the origin of the left internal carotid artery with heavily calcified plaque at this level. Carotid US 11/4/17 reports no significant stenosis.   --Continue high intensity statin, atorvastatin 40mg daily.   --Continue aspirin 81mg.    --Consider repeat US November 2020.         RBBB (right bundle branch block)     The patient has had no syncopal episodes.    No clinical bradyarrhythmias are noted.    --Continue to monitor.   --Treat obstructive sleep apnea as above.         Type 2 diabetes mellitus with circulatory disorder, with long-term current use of insulin (CMS/[facility])     Patient reports his most recent hemoglobin A1c elevated.  Treated with insulin.  --He reports significant dietary indiscretion.  Lifestyle modification discussed at length.         Peripheral arterial disease (CMS/[facility])     >75% left SFA with claudication.   Stable claudication.  --He is not exercising. Stressed the importance of walking.   --Followed by Dr. Wiley.   --Continue aspirin and statin.             Endocrine/Metabolic    Mixed hyperlipidemia     The patient has associated coronary and peripheral artery disease and diabetes.    He is tolerating lipid lowering therapy well.    Lipids from 3/13/17: total cholesterol 144, triglycerides 130, HDL 44, LDL  74.  --Continue high-intensity statin, atorvastatin 40 mg daily.   --Goal LDL less than 70.         Class 3 severe obesity due to excess calories with serious comorbidity and body mass index (BMI) of 45.0 to 49.9 in adult (CMS/[facility])     BMI 41.54.  --Encouraged weight loss.  --Needs to start walking or doing some exercise daily.  Lifestyle modification discussed at length.                 Return in about 6 months (around 7/16/2020).    Crys Valencia MD, FACC

## 2020-01-19 NOTE — ASSESSMENT & PLAN NOTE
Patient reports his most recent hemoglobin A1c elevated.  Treated with insulin.  --He reports significant dietary indiscretion.  Lifestyle modification discussed at length.

## 2020-01-19 NOTE — ASSESSMENT & PLAN NOTE
Hospitalized October 14-24, 2016 for acute on chronic diastolic heart failure with aggressive diuresis with IV Bumex and ultimate transition to PO torsemide prior to discharge.   Weight stable.  Dyspnea and hypoxemia are likely multifactorial: secondary to chronic diastolic heart failure, untreated sleep apnea, and restrictive lung disease in the setting of obesity.   NYHA Class II-III. ACC/AHA Stage C.  --Continue torsemide 40mg TWICE daily.   --Monitor home weights and call with increase >2 pounds in 24 hours.   --Stressed fluid and dietary compliance. CHF education provided.   --Stressed at length the importance of compliance with BiPAP, which has been an ongoing struggle for him.   --Also recommend compression socks, knee length.   --CHF diet discussed at length.

## 2020-01-19 NOTE — ASSESSMENT & PLAN NOTE
BMI 41.54.  --Encouraged weight loss.  --Needs to start walking or doing some exercise daily.  Lifestyle modification discussed at length.

## 2020-01-19 NOTE — ASSESSMENT & PLAN NOTE
The patient has associated coronary and peripheral artery disease and diabetes.    He is tolerating lipid lowering therapy well.    Lipids from 3/13/17: total cholesterol 144, triglycerides 130, HDL 44, LDL 74.  --Continue high-intensity statin, atorvastatin 40 mg daily.   --Goal LDL less than 70.

## 2020-01-19 NOTE — ASSESSMENT & PLAN NOTE
[facility] 10/14/16: RA 16, RV 75/16, PA 75/27, PCWP 26, CO 5.7, CI 2.7, PA sat 58%.  Secondary to diastolic heart failure, untreated/undertreated FREDRICK and underlying obesity hypoventilation syndrome.  --Continue diuresis.  --Encouraged weight loss.

## 2020-01-19 NOTE — ASSESSMENT & PLAN NOTE
Improved but inconsistent compliance with BiPAP.   --Again stressed the importance of compliance with BiPAP therapy and risk of heart failure, severe hypertension, arrhythmia, stroke, and death without treatment. He reports understanding and will attempt to increase use. Follow-up with sleep medicine.   --Discussed lifestyle modification and need for weight loss. Recommend consideration of bariatric surgery as a majority of his medical issues are secondary to obesity.

## 2020-01-19 NOTE — ASSESSMENT & PLAN NOTE
Status post #23 [employer] Trifecta, 5/14/15 with Dr. Quiroga.   Echocardiogram from 10/5/18: Status post #23 trifecta aortic valve prosthesis with peak velocity 2.5 m/s, mean gradient 16 mmHg. Mild to moderate aortic regurgitation.   Increased degree of aortic regurgitation.  --Plan to repeat echocardiogram after next visit to monitor aortic regurgitation.  --Continue aspirin and statin.   --Patient aware of need for antibiotic prophylaxis prior to dental procedures, has a prescription for amoxicillin.

## 2020-01-19 NOTE — ASSESSMENT & PLAN NOTE
Coronary angiogram 3/9/15 ([place]): patent LMCA, 30% prox LCx, 30% PDA.    Asymptomatic.   --Continue aspirin, high intensity statin, and beta blocker.

## 2020-01-19 NOTE — ASSESSMENT & PLAN NOTE
Carotid CTA 9/9/2014 showed mild atherosclerosis of the right carotid bifurcation and 40% stenosis at the origin of the left internal carotid artery with heavily calcified plaque at this level. Carotid US 11/4/17 reports no significant stenosis.   --Continue high intensity statin, atorvastatin 40mg daily.   --Continue aspirin 81mg.    --Consider repeat US November 2020.

## 2020-01-21 RX ORDER — METOPROLOL SUCCINATE 100 MG/1
150 TABLET, EXTENDED RELEASE ORAL DAILY
Qty: 180 TABLET | Refills: 3 | Status: SHIPPED | OUTPATIENT
Start: 2020-01-21

## 2020-02-24 ENCOUNTER — FOLLOW UP (OUTPATIENT)
Dept: URBAN - METROPOLITAN AREA CLINIC 35 | Facility: CLINIC | Age: 70
End: 2020-02-24

## 2020-02-24 DIAGNOSIS — H43.393: ICD-10-CM

## 2020-02-24 DIAGNOSIS — E11.9: ICD-10-CM

## 2020-02-24 DIAGNOSIS — H34.11: ICD-10-CM

## 2020-02-24 DIAGNOSIS — H25.9: ICD-10-CM

## 2020-02-24 DIAGNOSIS — H40.9: ICD-10-CM

## 2020-02-24 PROCEDURE — 92134 CPTRZ OPH DX IMG PST SGM RTA: CPT

## 2020-02-24 PROCEDURE — 92014 COMPRE OPH EXAM EST PT 1/>: CPT

## 2020-02-24 ASSESSMENT — TONOMETRY
OS_IOP_MMHG: 13
OD_IOP_MMHG: 14

## 2020-02-24 ASSESSMENT — VISUAL ACUITY
OS_PH: 20/30+1
OS_CC: 20/50+1

## 2020-03-02 ENCOUNTER — TELEPHONE (OUTPATIENT)
Dept: SCHEDULING | Facility: CLINIC | Age: 70
End: 2020-03-02

## 2020-03-02 NOTE — TELEPHONE ENCOUNTER
Spoke with Felicia.  Patient was feeling unwell with two weeks of severe unilateral leg pain. Developed severe indigestion, nausea, malaise. Called 911. He suffered a cardiac arrest in the ambulance and they could not resuscitate him. Felicia worried she could have done something, which I assured her she could not and expressed my sincere condolences.  Erika, can you please anna Mr. Solorio as .

## 2020-03-02 NOTE — TELEPHONE ENCOUNTER
Dr Valencia pat called stating her  passed away over the weekend. She has questions for Dr Valencia.  She wants to know if there was anything she, Felicia, could have done to prevent this?  Could anything at all been done for him?   Please call Felicia at [phone]